# Patient Record
Sex: FEMALE | Race: WHITE | NOT HISPANIC OR LATINO | URBAN - METROPOLITAN AREA
[De-identification: names, ages, dates, MRNs, and addresses within clinical notes are randomized per-mention and may not be internally consistent; named-entity substitution may affect disease eponyms.]

---

## 2018-01-16 ENCOUNTER — EMERGENCY (EMERGENCY)
Facility: HOSPITAL | Age: 58
LOS: 0 days | Discharge: HOME | End: 2018-01-16
Attending: EMERGENCY MEDICINE | Admitting: EMERGENCY MEDICINE
Payer: COMMERCIAL

## 2018-01-16 VITALS — WEIGHT: 190.04 LBS | HEIGHT: 64 IN

## 2018-01-16 VITALS
HEART RATE: 72 BPM | DIASTOLIC BLOOD PRESSURE: 85 MMHG | RESPIRATION RATE: 19 BRPM | TEMPERATURE: 98 F | SYSTOLIC BLOOD PRESSURE: 153 MMHG | OXYGEN SATURATION: 99 %

## 2018-01-16 DIAGNOSIS — R51 HEADACHE: ICD-10-CM
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
## 2018-01-16 DIAGNOSIS — S09.90XA UNSPECIFIED INJURY OF HEAD, INITIAL ENCOUNTER: ICD-10-CM
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
## 2018-01-16 DIAGNOSIS — R60.0 LOCALIZED EDEMA: ICD-10-CM

## 2018-01-16 DIAGNOSIS — S60.222A CONTUSION OF LEFT HAND, INITIAL ENCOUNTER: ICD-10-CM

## 2018-01-16 DIAGNOSIS — M79.642 PAIN IN LEFT HAND: ICD-10-CM
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
## 2018-01-16 PROCEDURE — 70450 CT HEAD/BRAIN W/O DYE: CPT | Mod: 26

## 2018-01-16 PROCEDURE — 73130 X-RAY EXAM OF HAND: CPT | Mod: 26,LT

## 2018-01-16 PROCEDURE — 99285 EMERGENCY DEPT VISIT HI MDM: CPT

## 2018-01-16 RX ORDER — IBUPROFEN 200 MG
600 TABLET ORAL ONCE
Qty: 0 | Refills: 0 | Status: COMPLETED | OUTPATIENT
Start: 2018-01-16 | End: 2018-01-16

## 2018-01-16 RX ORDER — DIAZEPAM 5 MG
5 TABLET ORAL ONCE
Qty: 0 | Refills: 0 | Status: DISCONTINUED | OUTPATIENT
Start: 2018-01-16 | End: 2018-01-16

## 2018-01-16 RX ADMIN — Medication 600 MILLIGRAM(S): at 12:19

## 2018-01-16 RX ADMIN — Medication 5 MILLIGRAM(S): at 12:19

## 2018-01-16 NOTE — ED STATDOCS - MUSCULOSKELETAL, MLM
left hand ecchymosis and swelling on dorsal aspect from first metacarpal through second metacarpal, mild bony tenderness, FROM, NVI. Right lower lumbar paraspinal spasm and tenderness, no midline tenderness to c/t/l-spine.
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
## 2018-01-16 NOTE — ED STATDOCS - ATTENDING CONTRIBUTION TO CARE
I, Duncan Pickering DO,  performed the initial face to face bedside interview with this patient regarding history of present illness, review of symptoms and relevant past medical, social and family history.  I completed an independent physical examination.  I was the initial provider who evaluated this patient. I have signed out the follow up of any pending tests (i.e. labs, radiological studies) to the ACP.  I have communicated the patient’s plan of care and disposition with the ACP.

## 2018-01-16 NOTE — ED STATDOCS - PROGRESS NOTE DETAILS
Patient seen and evaluated.  No acute findings on imaging.  Reviewed ss of concussion with patient and will give info for concussion program in discharge paperwork.  Reviewed RICE for hand contusion -Christa Roper PA-C

## 2018-01-16 NOTE — ED STATDOCS - NS_ ATTENDINGSCRIBEDETAILS _ED_A_ED_FT
Duncan Pickering DO (Attending): The history, relevant review of systems, past medical and surgical history, medical decision making, and physical examination was documented by the scribe in my presence and I attest to the accuracy of the documentation.
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
## 2018-01-17 ENCOUNTER — APPOINTMENT (OUTPATIENT)
Dept: INTERNAL MEDICINE | Facility: CLINIC | Age: 58
End: 2018-01-17
Payer: COMMERCIAL

## 2018-01-17 VITALS — DIASTOLIC BLOOD PRESSURE: 76 MMHG | SYSTOLIC BLOOD PRESSURE: 126 MMHG

## 2018-01-17 VITALS
TEMPERATURE: 98.2 F | OXYGEN SATURATION: 97 % | RESPIRATION RATE: 18 BRPM | HEIGHT: 64 IN | DIASTOLIC BLOOD PRESSURE: 88 MMHG | WEIGHT: 190 LBS | HEART RATE: 76 BPM | SYSTOLIC BLOOD PRESSURE: 142 MMHG | BODY MASS INDEX: 32.44 KG/M2

## 2018-01-17 DIAGNOSIS — S39.012D STRAIN OF MUSCLE, FASCIA AND TENDON OF LOWER BACK, SUBSEQUENT ENCOUNTER: ICD-10-CM

## 2018-01-17 DIAGNOSIS — Z82.3 FAMILY HISTORY OF STROKE: ICD-10-CM

## 2018-01-17 DIAGNOSIS — Z78.9 OTHER SPECIFIED HEALTH STATUS: ICD-10-CM

## 2018-01-17 DIAGNOSIS — Z86.39 PERSONAL HISTORY OF OTHER ENDOCRINE, NUTRITIONAL AND METABOLIC DISEASE: ICD-10-CM

## 2018-01-17 DIAGNOSIS — S06.0X0D CONCUSSION W/OUT LOSS OF CONSCIOUSNESS, SUBSEQUENT ENCOUNTER: ICD-10-CM

## 2018-01-17 DIAGNOSIS — Z63.5 DISRUPTION OF FAMILY BY SEPARATION AND DIVORCE: ICD-10-CM

## 2018-01-17 DIAGNOSIS — Z87.39 PERSONAL HISTORY OF OTHER DISEASES OF THE MUSCULOSKELETAL SYSTEM AND CONNECTIVE TISSUE: ICD-10-CM

## 2018-01-17 DIAGNOSIS — S60.222A CONTUSION OF LEFT HAND, INITIAL ENCOUNTER: ICD-10-CM

## 2018-01-17 DIAGNOSIS — Z86.79 PERSONAL HISTORY OF OTHER DISEASES OF THE CIRCULATORY SYSTEM: ICD-10-CM

## 2018-01-17 PROCEDURE — 99214 OFFICE O/P EST MOD 30 MIN: CPT

## 2018-01-17 SDOH — SOCIAL STABILITY - SOCIAL INSECURITY: DISRUPTION OF FAMILY BY SEPARATION AND DIVORCE: Z63.5

## 2018-01-19 ENCOUNTER — APPOINTMENT (OUTPATIENT)
Dept: PHYSICAL MEDICINE AND REHAB | Facility: CLINIC | Age: 58
End: 2018-01-19
Payer: COMMERCIAL

## 2018-01-19 VITALS
WEIGHT: 190 LBS | SYSTOLIC BLOOD PRESSURE: 146 MMHG | DIASTOLIC BLOOD PRESSURE: 101 MMHG | BODY MASS INDEX: 32.44 KG/M2 | OXYGEN SATURATION: 97 % | RESPIRATION RATE: 18 BRPM | HEIGHT: 64 IN | HEART RATE: 64 BPM

## 2018-01-19 PROBLEM — S60.222A CONTUSION OF LEFT HAND, INITIAL ENCOUNTER: Status: ACTIVE | Noted: 2018-01-19

## 2018-01-19 PROCEDURE — 99204 OFFICE O/P NEW MOD 45 MIN: CPT

## 2018-07-02 ENCOUNTER — APPOINTMENT (OUTPATIENT)
Dept: INTERNAL MEDICINE | Facility: CLINIC | Age: 58
End: 2018-07-02
Payer: COMMERCIAL

## 2018-07-02 VITALS
RESPIRATION RATE: 16 BRPM | HEART RATE: 82 BPM | HEIGHT: 64 IN | OXYGEN SATURATION: 97 % | TEMPERATURE: 98.4 F | DIASTOLIC BLOOD PRESSURE: 84 MMHG | BODY MASS INDEX: 32.78 KG/M2 | SYSTOLIC BLOOD PRESSURE: 138 MMHG | WEIGHT: 192 LBS

## 2018-07-02 PROCEDURE — 99214 OFFICE O/P EST MOD 30 MIN: CPT

## 2018-07-02 RX ORDER — CYCLOBENZAPRINE HYDROCHLORIDE 5 MG/1
5 TABLET, FILM COATED ORAL
Qty: 21 | Refills: 0 | Status: DISCONTINUED | COMMUNITY
Start: 2018-01-19 | End: 2018-07-02

## 2018-07-02 RX ORDER — B-COMPLEX WITH VITAMIN C
TABLET ORAL DAILY
Refills: 0 | Status: ACTIVE | COMMUNITY

## 2018-07-02 RX ORDER — MULTIVITAMIN
TABLET ORAL DAILY
Refills: 0 | Status: ACTIVE | COMMUNITY

## 2018-07-02 RX ORDER — BACILLUS COAGULANS/INULIN 1B-250 MG
CAPSULE ORAL
Refills: 0 | Status: ACTIVE | COMMUNITY

## 2018-07-23 PROBLEM — Z78.9 ALCOHOL USE: Status: ACTIVE | Noted: 2018-01-17

## 2018-07-24 DIAGNOSIS — R74.8 ABNORMAL LEVELS OF OTHER SERUM ENZYMES: ICD-10-CM

## 2018-08-02 ENCOUNTER — OUTPATIENT (OUTPATIENT)
Dept: OUTPATIENT SERVICES | Facility: HOSPITAL | Age: 58
LOS: 1 days | End: 2018-08-02
Payer: COMMERCIAL

## 2018-08-02 ENCOUNTER — APPOINTMENT (OUTPATIENT)
Dept: ULTRASOUND IMAGING | Facility: CLINIC | Age: 58
End: 2018-08-02
Payer: COMMERCIAL

## 2018-08-02 DIAGNOSIS — Z00.8 ENCOUNTER FOR OTHER GENERAL EXAMINATION: ICD-10-CM

## 2018-08-02 PROCEDURE — 76856 US EXAM PELVIC COMPLETE: CPT

## 2018-08-02 PROCEDURE — 76700 US EXAM ABDOM COMPLETE: CPT | Mod: 26

## 2018-08-02 PROCEDURE — 76856 US EXAM PELVIC COMPLETE: CPT | Mod: 26

## 2018-08-02 PROCEDURE — 76700 US EXAM ABDOM COMPLETE: CPT

## 2018-08-04 ENCOUNTER — TRANSCRIPTION ENCOUNTER (OUTPATIENT)
Age: 58
End: 2018-08-04

## 2018-12-31 ENCOUNTER — APPOINTMENT (OUTPATIENT)
Dept: RHEUMATOLOGY | Facility: CLINIC | Age: 58
End: 2018-12-31
Payer: COMMERCIAL

## 2018-12-31 VITALS
DIASTOLIC BLOOD PRESSURE: 90 MMHG | SYSTOLIC BLOOD PRESSURE: 130 MMHG | HEART RATE: 70 BPM | OXYGEN SATURATION: 98 % | HEIGHT: 64 IN | BODY MASS INDEX: 31.58 KG/M2 | WEIGHT: 185 LBS

## 2018-12-31 PROCEDURE — 99204 OFFICE O/P NEW MOD 45 MIN: CPT

## 2019-01-05 ENCOUNTER — LABORATORY RESULT (OUTPATIENT)
Age: 59
End: 2019-01-05

## 2019-01-06 LAB
25(OH)D3 SERPL-MCNC: 16.2 NG/ML
ALBUMIN SERPL ELPH-MCNC: 4.1 G/DL
ALP BLD-CCNC: 64 U/L
ALT SERPL-CCNC: 52 U/L
ANION GAP SERPL CALC-SCNC: 12 MMOL/L
AST SERPL-CCNC: 28 U/L
BASOPHILS # BLD AUTO: 0.05 K/UL
BASOPHILS NFR BLD AUTO: 0.8 %
BILIRUB SERPL-MCNC: 0.4 MG/DL
BUN SERPL-MCNC: 14 MG/DL
CALCIUM SERPL-MCNC: 9 MG/DL
CHLORIDE SERPL-SCNC: 104 MMOL/L
CO2 SERPL-SCNC: 23 MMOL/L
CREAT SERPL-MCNC: 0.56 MG/DL
CREAT SPEC-SCNC: 23 MG/DL
CREAT/PROT UR: NORMAL
CRP SERPL-MCNC: 0.15 MG/DL
EOSINOPHIL # BLD AUTO: 0.35 K/UL
EOSINOPHIL NFR BLD AUTO: 5.7 %
ERYTHROCYTE [SEDIMENTATION RATE] IN BLOOD BY WESTERGREN METHOD: 7 MM/HR
GLUCOSE SERPL-MCNC: 97 MG/DL
HCT VFR BLD CALC: 42.7 %
HGB BLD-MCNC: 14 G/DL
IMM GRANULOCYTES NFR BLD AUTO: 0.2 %
LYMPHOCYTES # BLD AUTO: 2.34 K/UL
LYMPHOCYTES NFR BLD AUTO: 38 %
MAN DIFF?: NORMAL
MCHC RBC-ENTMCNC: 29.9 PG
MCHC RBC-ENTMCNC: 32.8 GM/DL
MCV RBC AUTO: 91 FL
MONOCYTES # BLD AUTO: 0.62 K/UL
MONOCYTES NFR BLD AUTO: 10.1 %
NEUTROPHILS # BLD AUTO: 2.79 K/UL
NEUTROPHILS NFR BLD AUTO: 45.2 %
PLATELET # BLD AUTO: 242 K/UL
POTASSIUM SERPL-SCNC: 4.3 MMOL/L
PROT SERPL-MCNC: 6.4 G/DL
PROT UR-MCNC: <4 MG/DL
RBC # BLD: 4.69 M/UL
RBC # FLD: 12.6 %
RHEUMATOID FACT SER QL: <10 IU/ML
SODIUM SERPL-SCNC: 139 MMOL/L
TSH SERPL-ACNC: 2.65 UIU/ML
WBC # FLD AUTO: 6.16 K/UL

## 2019-01-07 ENCOUNTER — APPOINTMENT (OUTPATIENT)
Dept: INTERNAL MEDICINE | Facility: CLINIC | Age: 59
End: 2019-01-07

## 2019-01-08 LAB
CARDIOLIPIN IGM SER-MCNC: 8.2 MPL
CARDIOLIPIN IGM SER-MCNC: <5 GPL
CCP AB SER IA-ACNC: <8 UNITS
DSDNA AB SER-ACNC: 15 IU/ML
ENA RNP AB SER IA-ACNC: <0.2 AL
ENA SM AB SER IA-ACNC: <0.2 AL
ENA SS-A AB SER IA-ACNC: <0.2 AL
ENA SS-B AB SER IA-ACNC: 1.9 AL
RF+CCP IGG SER-IMP: NEGATIVE

## 2019-01-09 LAB
ANA SER IF-ACNC: NEGATIVE
G6PD SER-CCNC: 14.6 U/G HGB

## 2019-01-31 ENCOUNTER — APPOINTMENT (OUTPATIENT)
Dept: INTERNAL MEDICINE | Facility: CLINIC | Age: 59
End: 2019-01-31
Payer: COMMERCIAL

## 2019-01-31 ENCOUNTER — NON-APPOINTMENT (OUTPATIENT)
Age: 59
End: 2019-01-31

## 2019-01-31 VITALS
DIASTOLIC BLOOD PRESSURE: 84 MMHG | SYSTOLIC BLOOD PRESSURE: 122 MMHG | OXYGEN SATURATION: 97 % | RESPIRATION RATE: 18 BRPM | HEART RATE: 69 BPM | WEIGHT: 189 LBS | BODY MASS INDEX: 32.27 KG/M2 | HEIGHT: 64 IN

## 2019-01-31 DIAGNOSIS — J45.20 MILD INTERMITTENT ASTHMA, UNCOMPLICATED: ICD-10-CM

## 2019-01-31 PROCEDURE — G0444 DEPRESSION SCREEN ANNUAL: CPT

## 2019-01-31 PROCEDURE — 94060 EVALUATION OF WHEEZING: CPT

## 2019-01-31 PROCEDURE — G0447 BEHAVIOR COUNSEL OBESITY 15M: CPT

## 2019-01-31 PROCEDURE — G0442 ANNUAL ALCOHOL SCREEN 15 MIN: CPT

## 2019-01-31 PROCEDURE — 99214 OFFICE O/P EST MOD 30 MIN: CPT | Mod: 25

## 2019-01-31 RX ORDER — UBIDECARENONE/VIT E ACET 100MG-5
50 MCG CAPSULE ORAL
Qty: 100 | Refills: 0 | Status: DISCONTINUED | COMMUNITY
Start: 2018-01-17 | End: 2019-01-31

## 2019-01-31 NOTE — ASSESSMENT
[FreeTextEntry1] : #1 health care maintenance. Patient to see gynecologist soon for annual mammography and PAP.\par \par #2 obesity. BMI 32.44. Patient was counseled onobesity and instructed on a weight reduction diet. Time spent on counseling and instructions was 16 minutes.\par \par #3 Vit deficiency.\par \par #4 Anxiety/depression.\par \par RV 6 mos or [prn.

## 2019-01-31 NOTE — COUNSELING
[Weight management counseling provided] : Weight management [Healthy eating counseling provided] : healthy eating [Activity counseling provided] : activity [Low Fat Diet] : Low fat diet [Decrease Portions] : Decrease food portions [___ min/wk activity recommended] : [unfilled] min/wk activity recommended [ - Annual Alcohol Misuse Screening] : Annual Alcohol Misuse Screening [ - Behavioral Counseling for Obesity (Face-to-Face for 15 Minutes)] : Behavioral Counseling for Obesity (Face-to-Face for 15 Minutes) [ - Annual Depression Screening] : Annual Depression Screening

## 2019-01-31 NOTE — HISTORY OF PRESENT ILLNESS
[FreeTextEntry1] : 6 mo visit [de-identified] : This is a pleasant 58-year-old female with a history of depression, anxiety low vitamin D level returned her following appointment per she is generally been doing well and offers no complaints. She does not smoke cigarettes. She has an occasional drink. She had a mammography in 2017 and is to see her gynecologist again seen. She had colonoscop;y in 2016.  she is refusing flu vac.\par \par She is maintained on oral vitamin D 49277 units weekly for vitamin D deficiency.

## 2019-01-31 NOTE — HEALTH RISK ASSESSMENT
[] : Yes [1] : 1) Little interest or pleasure doing things for several days (1) [0] : 2) Feeling down, depressed, or hopeless: Not at all (0) [[id number]] : 1 [Patient reported mammogram was normal] : Patient reported mammogram was normal [Patient reported colonoscopy was normal] : Patient reported colonoscopy was normal [MammogramDate] : 01/2017 [ColonoscopyDate] : 01/2016

## 2019-03-11 ENCOUNTER — TRANSCRIPTION ENCOUNTER (OUTPATIENT)
Age: 59
End: 2019-03-11

## 2019-03-27 ENCOUNTER — APPOINTMENT (OUTPATIENT)
Dept: DERMATOLOGY | Facility: CLINIC | Age: 59
End: 2019-03-27
Payer: COMMERCIAL

## 2019-03-27 VITALS — BODY MASS INDEX: 32.27 KG/M2 | HEIGHT: 64 IN | WEIGHT: 189 LBS

## 2019-03-27 PROCEDURE — 11102 TANGNTL BX SKIN SINGLE LES: CPT

## 2019-03-27 PROCEDURE — 99203 OFFICE O/P NEW LOW 30 MIN: CPT | Mod: 25

## 2019-03-27 NOTE — PHYSICAL EXAM
[Alert] : alert [Oriented x 3] : ~L oriented x 3 [Well Nourished] : well nourished [Full Body Skin Exam Performed] : performed [Eyelids] : Eyelids [Ears] : Ears [Lips] : Lips [Neck] : Neck [FreeTextEntry3] : A full skin exam was performed including the scalp, face, neck, chest, abdomen, back, buttocks, upper extremities and lower extremities.  The patient declined examination of the breasts and genitalia.  \par The exam did show the following benign growths:\par [place] pigmented nevi.\par Lentigines.\par \par hyperpigmented macule with surrounding erythematous patch.  ELM gen.

## 2019-03-27 NOTE — HISTORY OF PRESENT ILLNESS
[FreeTextEntry1] : Patient presents for skin examination. [de-identified] : Notes new lesion of the right medial calf over the past 6 months.  no bleeding.  ? growth.  No self tx.

## 2019-03-27 NOTE — ASSESSMENT
[FreeTextEntry1] : A complete skin examination was performed.  We discussed the importance of photoprotection, including the use of hats, protective clothing and sunscreens with an SPF of at least 30.  Sun avoidance was also discussed.\par \par R/O atypical nevus, melanoma vs. irritated nevus.\par Will call patient with results.

## 2019-03-29 ENCOUNTER — APPOINTMENT (OUTPATIENT)
Dept: RHEUMATOLOGY | Facility: CLINIC | Age: 59
End: 2019-03-29
Payer: COMMERCIAL

## 2019-03-29 VITALS
BODY MASS INDEX: 30.73 KG/M2 | HEIGHT: 64 IN | HEART RATE: 60 BPM | SYSTOLIC BLOOD PRESSURE: 126 MMHG | WEIGHT: 180 LBS | OXYGEN SATURATION: 98 % | DIASTOLIC BLOOD PRESSURE: 78 MMHG

## 2019-03-29 PROCEDURE — 99214 OFFICE O/P EST MOD 30 MIN: CPT

## 2019-03-29 NOTE — PHYSICAL EXAM
[General Appearance - Alert] : alert [General Appearance - Well Nourished] : well nourished [General Appearance - Well Developed] : well developed [Sclera] : the sclera and conjunctiva were normal [Outer Ear] : the ears and nose were normal in appearance [Oropharynx] : the oropharynx was normal [Neck Appearance] : the appearance of the neck was normal [Respiration, Rhythm And Depth] : normal respiratory rhythm and effort [Auscultation Breath Sounds / Voice Sounds] : lungs were clear to auscultation bilaterally [Heart Sounds] : normal S1 and S2 [Full Pulse] : the pedal pulses are present [Edema] : there was no peripheral edema [Abdomen Tenderness] : non-tender [Supraclavicular Lymph Nodes Enlarged Bilaterally] : supraclavicular [No Spinal Tenderness] : no spinal tenderness [Abnormal Walk] : normal gait [Nail Clubbing] : no clubbing  or cyanosis of the fingernails [Musculoskeletal - Swelling] : no joint swelling seen [Motor Tone] : muscle strength and tone were normal [FreeTextEntry1] : FROM neck, shoulders, elbows, wrists, hands, hips, knees, ankles and feet, including the small joints of the hands and feet without any evidence of inflammatory arthritis no tender points noted [] : no rash [Deep Tendon Reflexes (DTR)] : deep tendon reflexes were 2+ and symmetric [Motor Exam] : the motor exam was normal [Oriented To Time, Place, And Person] : oriented to person, place, and time [Impaired Insight] : insight and judgment were intact [Affect] : the affect was normal
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
## 2019-04-08 LAB — CORE LAB BIOPSY: NORMAL

## 2019-04-29 ENCOUNTER — APPOINTMENT (OUTPATIENT)
Dept: DERMATOLOGY | Facility: CLINIC | Age: 59
End: 2019-04-29
Payer: COMMERCIAL

## 2019-04-29 PROCEDURE — 13102 CMPLX RPR TRUNK ADDL 5CM/<: CPT

## 2019-04-29 PROCEDURE — 13101 CMPLX RPR TRUNK 2.6-7.5 CM: CPT

## 2019-04-29 PROCEDURE — 11604 EXC TR-EXT MAL+MARG 3.1-4 CM: CPT

## 2019-05-09 LAB — CORE LAB BIOPSY: NORMAL

## 2019-05-14 ENCOUNTER — APPOINTMENT (OUTPATIENT)
Dept: DERMATOLOGY | Facility: CLINIC | Age: 59
End: 2019-05-14
Payer: COMMERCIAL

## 2019-05-14 PROCEDURE — 99024 POSTOP FOLLOW-UP VISIT: CPT

## 2019-05-14 NOTE — HISTORY OF PRESENT ILLNESS
[de-identified] : Right calf well healed, without evidence of infection.\par Sutures removed.\par Continue wound care for additional 5 days.\par Path with clear margins.\par f/u q 6 months for TBSE's. [FreeTextEntry1] : Suture removal.

## 2019-05-24 ENCOUNTER — APPOINTMENT (OUTPATIENT)
Dept: DERMATOLOGY | Facility: CLINIC | Age: 59
End: 2019-05-24
Payer: COMMERCIAL

## 2019-05-24 PROCEDURE — 12020 TX SUPFC WND DEHSN SMPL CLSR: CPT | Mod: 79

## 2019-05-24 NOTE — PHYSICAL EXAM
[FreeTextEntry3] : R medial upper calf;\par surgical wound, complete dehiscence, with clean margins/wound bed\par no signs infection\par

## 2019-05-24 NOTE — HISTORY OF PRESENT ILLNESS
[de-identified] : The patient has been fit in for urgent appointment. \par Sutures removed from MM excision; R medial upper calf;  10 days ago\par Today, pt. was gardening, squatted down which put pressure on the leg and wound split open\par

## 2019-05-24 NOTE — ASSESSMENT
[FreeTextEntry1] : Wound dehiscence;  no signs of infection;\par options discussed with pt. and son;\par \par 2% Lido, 4 x 4-0 prolene pulley sutures to partially close wound;\par dressed with mupirocin, telfa, coban\par discussed that partial closure will protect wound while heals by 2' intention;\par f/u 5/28 for wound check

## 2019-05-28 ENCOUNTER — APPOINTMENT (OUTPATIENT)
Dept: DERMATOLOGY | Facility: CLINIC | Age: 59
End: 2019-05-28
Payer: COMMERCIAL

## 2019-05-28 PROCEDURE — 99024 POSTOP FOLLOW-UP VISIT: CPT

## 2019-05-28 NOTE — ASSESSMENT
[FreeTextEntry1] : Dehiscence\par education.\par Wound care discussed.\par sutures to remain for a full 2 weeks, then will remove, and limit patients activity for an additional 2-4 weeks.

## 2019-05-28 NOTE — HISTORY OF PRESENT ILLNESS
[FreeTextEntry1] : Dehiscence of the right calf surgical site. [de-identified] : She was seen 4 days ago, at which time Dr. Coles give 4 new sutures, nearly approximating wound edges, and tx'ing with bactroban ointment.  Currently with mild irritation, and mild aroma, but has had dressing in place since placed at time of new sutures.\par Dehiscence occurred while weeding garden, almost 2 weeks s/p suture removal, almost 4 weeks s/p surgical excision.

## 2019-06-06 ENCOUNTER — APPOINTMENT (OUTPATIENT)
Dept: DERMATOLOGY | Facility: CLINIC | Age: 59
End: 2019-06-06
Payer: COMMERCIAL

## 2019-06-06 VITALS — WEIGHT: 180 LBS | BODY MASS INDEX: 30.73 KG/M2 | HEIGHT: 64 IN

## 2019-06-06 DIAGNOSIS — T81.31XA DISRUPTION OF EXTERNAL OPERATION (SURGICAL) WOUND, NOT ELSEWHERE CLASSIFIED, INITIAL ENCOUNTER: ICD-10-CM

## 2019-06-06 PROCEDURE — 99212 OFFICE O/P EST SF 10 MIN: CPT

## 2019-06-06 NOTE — ASSESSMENT
[FreeTextEntry1] : Patient s/p excision.\par Wound healing nicely with granulation tissue at the base.\par Leg wrapped.\par Further wound care discussed.

## 2019-06-06 NOTE — PHYSICAL EXAM
[Alert] : alert [Well Nourished] : well nourished [Oriented x 3] : ~L oriented x 3 [FreeTextEntry3] : Right calf with slowly healing linear incision.\par Sutures removed.\par

## 2019-06-06 NOTE — HISTORY OF PRESENT ILLNESS
[FreeTextEntry1] : Dehiscence - healing well, on the right calf. [de-identified] : Still with some drainage.

## 2019-06-19 ENCOUNTER — RX RENEWAL (OUTPATIENT)
Age: 59
End: 2019-06-19

## 2019-06-27 ENCOUNTER — APPOINTMENT (OUTPATIENT)
Dept: DERMATOLOGY | Facility: CLINIC | Age: 59
End: 2019-06-27
Payer: COMMERCIAL

## 2019-06-27 VITALS — WEIGHT: 180 LBS | BODY MASS INDEX: 30.73 KG/M2 | HEIGHT: 64 IN

## 2019-06-27 PROCEDURE — 99213 OFFICE O/P EST LOW 20 MIN: CPT

## 2019-06-27 NOTE — ASSESSMENT
[FreeTextEntry1] : Patient may return to full activity.\par Discussed sunscreens, sun protection.\par f/u twice annually for TBSE's.

## 2019-06-27 NOTE — HISTORY OF PRESENT ILLNESS
[FreeTextEntry1] : Wound check on the right leg. [de-identified] : Patient s/p melanoma excision, with dehiscence, re-repair, and for re-evaluation.

## 2019-07-08 ENCOUNTER — APPOINTMENT (OUTPATIENT)
Dept: INTERNAL MEDICINE | Facility: CLINIC | Age: 59
End: 2019-07-08

## 2019-07-10 ENCOUNTER — APPOINTMENT (OUTPATIENT)
Dept: INTERNAL MEDICINE | Facility: CLINIC | Age: 59
End: 2019-07-10
Payer: COMMERCIAL

## 2019-07-10 VITALS
OXYGEN SATURATION: 98 % | DIASTOLIC BLOOD PRESSURE: 66 MMHG | WEIGHT: 180 LBS | HEIGHT: 64 IN | RESPIRATION RATE: 16 BRPM | SYSTOLIC BLOOD PRESSURE: 114 MMHG | TEMPERATURE: 98 F | HEART RATE: 60 BPM | BODY MASS INDEX: 30.73 KG/M2

## 2019-07-10 PROCEDURE — 99396 PREV VISIT EST AGE 40-64: CPT

## 2019-07-10 RX ORDER — MUPIROCIN 20 MG/G
2 OINTMENT TOPICAL
Qty: 1 | Refills: 2 | Status: DISCONTINUED | COMMUNITY
Start: 2019-05-24 | End: 2019-07-10

## 2019-07-10 RX ORDER — SOFT LENS ADJUNCTIVE SOLUTIONS
DROPS MISCELLANEOUS
Refills: 0 | Status: ACTIVE | COMMUNITY

## 2019-07-10 NOTE — HISTORY OF PRESENT ILLNESS
[FreeTextEntry1] : Patient comes in for an annual physical exam.\par  [de-identified] : Patient is a 59-year-old female with history of Sjogren's syndrome, melanoma, anxiety and depression comes in for annual exam. She was recently diagnosed with melanoma of the right lower calf and March 2019. She had excision done by dermatology and her margins were clear and she is healing well. She'll continue to followup with dermatology regularly.\par \par She is also recently diagnosed with Sjogren's syndrome and is following with rheumatology regularly. She is opted to not start on medication and is instead treating her symptoms with vitamins and eyedrops.\par \par She follows regularly with psychologists Greer Goldsmith and sees her every 2 weeks. She takes Xanax apparently one tablet 2-3 times a month.\par She recently saw GYN and had a normal mammogram and a bone density which showed osteopenia.\par She notes that her colonoscopy was done in 2014 and she is now due for repeat.\par She is trying to cut back and lose weight as her daughter's wedding is coming up in October.

## 2019-07-10 NOTE — HEALTH RISK ASSESSMENT
[Good] : ~his/her~  mood as  good [Yes] : Yes [No] : In the past 12 months have you used drugs other than those required for medical reasons? No [No Retinopathy] : No retinopathy [Patient reported PAP Smear was normal] : Patient reported PAP Smear was normal [Patient reported mammogram was normal] : Patient reported mammogram was normal [Patient reported bone density results were normal] : Patient reported bone density results were normal [Smoke Detector] : smoke detector [Patient reported colonoscopy was normal] : Patient reported colonoscopy was normal [Employed] : employed [Seat Belt] :  uses seat belt [Carbon Monoxide Detector] : carbon monoxide detector [Sunscreen] : uses sunscreen [FreeTextEntry1] : physical  [de-identified] : former smoker  [] : No [YearQuit] : 1990 [de-identified] : occasional  [EyeExamDate] : 01/2019 [Guns at Home] : no guns at home [MammogramDate] : 01/2019 [PapSmearDate] : 01/2019 [BoneDensityDate] : 01/2019 [BoneDensityComments] : osteopenia  [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

## 2019-07-10 NOTE — PAST MEDICAL HISTORY
[Menarche Age ____] : age at menarche was [unfilled] [Total Preg ___] : G[unfilled] [Approximately ___] : the LMP was approximately [unfilled]

## 2019-07-10 NOTE — COUNSELING
[Healthy eating counseling provided] : healthy eating [Behavioral health counseling provided] : behavioral health  [Low Fat Diet] : Low fat diet [Activity counseling provided] : activity

## 2019-07-10 NOTE — ASSESSMENT
[FreeTextEntry1] : 1.health maintenance: She is up-to-date with mammogram, bone density and Pap smear. She will follow up with gastroenterology and repeat colonoscopy. Discussed fasting blood work.\par \par 2. Melanoma status post excision: Continue with skin surround and follow up with dermatology.\par \par 3.Sjogren syndrome: Continue on eyedrops and vitamin D, follow up with rheumatology.\par \par 4.anxiety and depression: She is estranged from her  who suffers from mental health and alcoholism. T4 with psychology and Xanax p.r.n.

## 2019-07-23 LAB
25(OH)D3 SERPL-MCNC: 42.4 NG/ML
CHOLEST SERPL-MCNC: 203 MG/DL
CHOLEST/HDLC SERPL: 4.3 RATIO
HDLC SERPL-MCNC: 47 MG/DL
LDLC SERPL CALC-MCNC: 135 MG/DL
TRIGL SERPL-MCNC: 103 MG/DL

## 2019-08-30 ENCOUNTER — RX RENEWAL (OUTPATIENT)
Age: 59
End: 2019-08-30

## 2019-09-14 ENCOUNTER — LABORATORY RESULT (OUTPATIENT)
Age: 59
End: 2019-09-14

## 2019-09-17 LAB
ALBUMIN MFR SERPL ELPH: 64.8 %
ALBUMIN SERPL ELPH-MCNC: 4.6 G/DL
ALBUMIN SERPL-MCNC: 4.3 G/DL
ALBUMIN/GLOB SERPL: 1.9 RATIO
ALP BLD-CCNC: 55 U/L
ALPHA1 GLOB MFR SERPL ELPH: 3.7 %
ALPHA1 GLOB SERPL ELPH-MCNC: 0.2 G/DL
ALPHA2 GLOB MFR SERPL ELPH: 8.5 %
ALPHA2 GLOB SERPL ELPH-MCNC: 0.6 G/DL
ALT SERPL-CCNC: 32 U/L
ANION GAP SERPL CALC-SCNC: 15 MMOL/L
APPEARANCE: CLEAR
AST SERPL-CCNC: 29 U/L
B-GLOBULIN MFR SERPL ELPH: 12.2 %
B-GLOBULIN SERPL ELPH-MCNC: 0.8 G/DL
BASOPHILS # BLD AUTO: 0.05 K/UL
BASOPHILS NFR BLD AUTO: 0.9 %
BILIRUB SERPL-MCNC: 0.4 MG/DL
BILIRUBIN URINE: NEGATIVE
BLOOD URINE: NEGATIVE
BUN SERPL-MCNC: 12 MG/DL
C3 SERPL-MCNC: 121 MG/DL
C4 SERPL-MCNC: 20 MG/DL
CALCIUM SERPL-MCNC: 9.5 MG/DL
CCP AB SER IA-ACNC: <8 UNITS
CHLORIDE SERPL-SCNC: 101 MMOL/L
CO2 SERPL-SCNC: 24 MMOL/L
COLOR: NORMAL
CREAT SERPL-MCNC: 0.87 MG/DL
CREAT SPEC-SCNC: 59 MG/DL
CREAT/PROT UR: 0.1 RATIO
CRP SERPL-MCNC: 0.15 MG/DL
DSDNA AB SER-ACNC: 22 IU/ML
ENA RNP AB SER IA-ACNC: <0.2 AL
ENA SM AB SER IA-ACNC: <0.2 AL
ENA SS-A AB SER IA-ACNC: <0.2 AL
ENA SS-B AB SER IA-ACNC: 1.4 AL
EOSINOPHIL # BLD AUTO: 0.22 K/UL
EOSINOPHIL NFR BLD AUTO: 3.8 %
ERYTHROCYTE [SEDIMENTATION RATE] IN BLOOD BY WESTERGREN METHOD: 7 MM/HR
GAMMA GLOB FLD ELPH-MCNC: 0.7 G/DL
GAMMA GLOB MFR SERPL ELPH: 10.8 %
GLUCOSE QUALITATIVE U: NEGATIVE
GLUCOSE SERPL-MCNC: 87 MG/DL
HCT VFR BLD CALC: 43.3 %
HGB BLD-MCNC: 14.5 G/DL
IMM GRANULOCYTES NFR BLD AUTO: 0.2 %
INTERPRETATION SERPL IEP-IMP: NORMAL
KETONES URINE: ABNORMAL
LEUKOCYTE ESTERASE URINE: NEGATIVE
LYMPHOCYTES # BLD AUTO: 2.06 K/UL
LYMPHOCYTES NFR BLD AUTO: 35.5 %
MAN DIFF?: NORMAL
MCHC RBC-ENTMCNC: 30.7 PG
MCHC RBC-ENTMCNC: 33.5 GM/DL
MCV RBC AUTO: 91.5 FL
MONOCYTES # BLD AUTO: 0.56 K/UL
MONOCYTES NFR BLD AUTO: 9.7 %
NEUTROPHILS # BLD AUTO: 2.9 K/UL
NEUTROPHILS NFR BLD AUTO: 49.9 %
NITRITE URINE: NEGATIVE
PH URINE: 7
PLATELET # BLD AUTO: 249 K/UL
POTASSIUM SERPL-SCNC: 4.4 MMOL/L
PROT SERPL-MCNC: 6.6 G/DL
PROT UR-MCNC: 4 MG/DL
PROTEIN URINE: NEGATIVE
RBC # BLD: 4.73 M/UL
RBC # FLD: 11.4 %
RF+CCP IGG SER-IMP: NEGATIVE
RHEUMATOID FACT SER QL: <10 IU/ML
SODIUM SERPL-SCNC: 140 MMOL/L
SPECIFIC GRAVITY URINE: 1.01
UROBILINOGEN URINE: NORMAL
WBC # FLD AUTO: 5.8 K/UL

## 2019-09-18 LAB — ANA SER IF-ACNC: NEGATIVE

## 2019-09-27 ENCOUNTER — APPOINTMENT (OUTPATIENT)
Dept: RHEUMATOLOGY | Facility: CLINIC | Age: 59
End: 2019-09-27
Payer: COMMERCIAL

## 2019-09-27 VITALS
OXYGEN SATURATION: 98 % | WEIGHT: 171 LBS | DIASTOLIC BLOOD PRESSURE: 83 MMHG | HEART RATE: 60 BPM | BODY MASS INDEX: 29.19 KG/M2 | HEIGHT: 64 IN | SYSTOLIC BLOOD PRESSURE: 128 MMHG

## 2019-09-27 PROCEDURE — 99214 OFFICE O/P EST MOD 30 MIN: CPT
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
## 2019-09-27 NOTE — PHYSICAL EXAM
[General Appearance - Alert] : alert [General Appearance - Well Nourished] : well nourished [General Appearance - Well Developed] : well developed [Sclera] : the sclera and conjunctiva were normal [Outer Ear] : the ears and nose were normal in appearance [Oropharynx] : the oropharynx was normal [Neck Appearance] : the appearance of the neck was normal [Respiration, Rhythm And Depth] : normal respiratory rhythm and effort [Heart Sounds] : normal S1 and S2 [Auscultation Breath Sounds / Voice Sounds] : lungs were clear to auscultation bilaterally [Abdomen Tenderness] : non-tender [Full Pulse] : the pedal pulses are present [Edema] : there was no peripheral edema [No Spinal Tenderness] : no spinal tenderness [Supraclavicular Lymph Nodes Enlarged Bilaterally] : supraclavicular [Abnormal Walk] : normal gait [Nail Clubbing] : no clubbing  or cyanosis of the fingernails [Motor Tone] : muscle strength and tone were normal [Musculoskeletal - Swelling] : no joint swelling seen [] : no rash [FreeTextEntry1] : + dry skin elbows [Deep Tendon Reflexes (DTR)] : deep tendon reflexes were 2+ and symmetric [Motor Exam] : the motor exam was normal [Impaired Insight] : insight and judgment were intact [Oriented To Time, Place, And Person] : oriented to person, place, and time [Affect] : the affect was normal

## 2019-10-02 ENCOUNTER — APPOINTMENT (OUTPATIENT)
Dept: DERMATOLOGY | Facility: CLINIC | Age: 59
End: 2019-10-02
Payer: COMMERCIAL

## 2019-10-02 PROCEDURE — 99213 OFFICE O/P EST LOW 20 MIN: CPT

## 2019-10-02 NOTE — ASSESSMENT
[FreeTextEntry1] : A complete skin examination was performed.  There is no evidence of skin cancer.  We discussed the importance of photoprotection, including the use of hats, protective clothing and sunscreens with an SPF of at least 30.  Sun avoidance was also discussed.\par \par hidrocystoma - right lateral canthal region.\par benign.

## 2019-10-02 NOTE — HISTORY OF PRESENT ILLNESS
[FreeTextEntry1] : Patient presents for skin examination. [de-identified] : Denies new, changing, bleeding or tender lesions on the skin over the past 6 months.\par

## 2019-10-02 NOTE — PHYSICAL EXAM
[Alert] : alert [Oriented x 3] : ~L oriented x 3 [Well Nourished] : well nourished [Full Body Skin Exam Performed] : performed [Nodes] : Nodes [FreeTextEntry3] : A full skin exam was performed including the scalp, face, neck, chest, abdomen, back, buttocks, upper extremities and lower extremities.  The patient declined examination of the breasts and genitalia.  \par The exam did not reveal any evidence of skin cancer, showing only the following benign growths:\par [place] pigmented nevi.\par Seborrheic keratoses.\par Lentigines.\par \par Clear cystic papule, with lateral canthal region.  3-4 mm.

## 2019-12-13 ENCOUNTER — RX RENEWAL (OUTPATIENT)
Age: 59
End: 2019-12-13

## 2020-01-10 ENCOUNTER — APPOINTMENT (OUTPATIENT)
Dept: INTERNAL MEDICINE | Facility: CLINIC | Age: 60
End: 2020-01-10
Payer: COMMERCIAL

## 2020-01-10 ENCOUNTER — NON-APPOINTMENT (OUTPATIENT)
Age: 60
End: 2020-01-10

## 2020-01-10 VITALS
BODY MASS INDEX: 31.07 KG/M2 | HEART RATE: 76 BPM | HEIGHT: 64 IN | SYSTOLIC BLOOD PRESSURE: 130 MMHG | WEIGHT: 182 LBS | RESPIRATION RATE: 16 BRPM | OXYGEN SATURATION: 98 % | TEMPERATURE: 98.1 F | DIASTOLIC BLOOD PRESSURE: 86 MMHG

## 2020-01-10 PROCEDURE — 99214 OFFICE O/P EST MOD 30 MIN: CPT | Mod: 25

## 2020-01-10 PROCEDURE — 93000 ELECTROCARDIOGRAM COMPLETE: CPT

## 2020-01-10 NOTE — ASSESSMENT
[FreeTextEntry1] : 1.hyperlipidemia: EKG in the office done and is stable from previous, obtain carotid ultrasound, recheck fasting lipids. Obtain records from cardiology.\par \par 2.vitamin D deficiency: Recheck level, continue on supplementation.\par \par 3.anxiety and depression: She is very happy to finally be , continue on Xanax as needed, follow up with psychology.

## 2020-02-20 ENCOUNTER — TRANSCRIPTION ENCOUNTER (OUTPATIENT)
Age: 60
End: 2020-02-20

## 2020-02-24 ENCOUNTER — TRANSCRIPTION ENCOUNTER (OUTPATIENT)
Age: 60
End: 2020-02-24

## 2020-03-10 LAB
ALBUMIN SERPL ELPH-MCNC: 4.3 G/DL
ALP BLD-CCNC: 64 U/L
ALT SERPL-CCNC: 28 U/L
ANION GAP SERPL CALC-SCNC: 13 MMOL/L
AST SERPL-CCNC: 14 U/L
BASOPHILS # BLD AUTO: 0.07 K/UL
BASOPHILS NFR BLD AUTO: 0.9 %
BILIRUB SERPL-MCNC: 0.4 MG/DL
BUN SERPL-MCNC: 12 MG/DL
CALCIUM SERPL-MCNC: 9.4 MG/DL
CHLORIDE SERPL-SCNC: 101 MMOL/L
CHOLEST SERPL-MCNC: 226 MG/DL
CHOLEST/HDLC SERPL: 4.2 RATIO
CO2 SERPL-SCNC: 26 MMOL/L
CREAT SERPL-MCNC: 0.83 MG/DL
EOSINOPHIL # BLD AUTO: 0.33 K/UL
EOSINOPHIL NFR BLD AUTO: 4.3 %
GLUCOSE SERPL-MCNC: 89 MG/DL
HCT VFR BLD CALC: 45.1 %
HDLC SERPL-MCNC: 54 MG/DL
HGB BLD-MCNC: 14.4 G/DL
IMM GRANULOCYTES NFR BLD AUTO: 0.1 %
LDLC SERPL CALC-MCNC: 132 MG/DL
LYMPHOCYTES # BLD AUTO: 2.63 K/UL
LYMPHOCYTES NFR BLD AUTO: 34.5 %
MAN DIFF?: NORMAL
MCHC RBC-ENTMCNC: 30.2 PG
MCHC RBC-ENTMCNC: 31.9 GM/DL
MCV RBC AUTO: 94.5 FL
MONOCYTES # BLD AUTO: 0.8 K/UL
MONOCYTES NFR BLD AUTO: 10.5 %
NEUTROPHILS # BLD AUTO: 3.78 K/UL
NEUTROPHILS NFR BLD AUTO: 49.7 %
PLATELET # BLD AUTO: 299 K/UL
POTASSIUM SERPL-SCNC: 4.4 MMOL/L
PROT SERPL-MCNC: 6.7 G/DL
RBC # BLD: 4.77 M/UL
RBC # FLD: 12.1 %
SODIUM SERPL-SCNC: 140 MMOL/L
TRIGL SERPL-MCNC: 204 MG/DL
WBC # FLD AUTO: 7.62 K/UL

## 2020-05-27 ENCOUNTER — APPOINTMENT (OUTPATIENT)
Dept: RHEUMATOLOGY | Facility: CLINIC | Age: 60
End: 2020-05-27
Payer: COMMERCIAL

## 2020-05-27 VITALS
BODY MASS INDEX: 31.58 KG/M2 | HEIGHT: 64 IN | WEIGHT: 185 LBS | SYSTOLIC BLOOD PRESSURE: 120 MMHG | OXYGEN SATURATION: 98 % | DIASTOLIC BLOOD PRESSURE: 80 MMHG | HEART RATE: 62 BPM | TEMPERATURE: 98.5 F

## 2020-05-27 PROCEDURE — 99214 OFFICE O/P EST MOD 30 MIN: CPT

## 2020-05-28 NOTE — PHYSICAL EXAM
[General Appearance - Alert] : alert [General Appearance - Well Nourished] : well nourished [General Appearance - Well Developed] : well developed [Outer Ear] : the ears and nose were normal in appearance [Sclera] : the sclera and conjunctiva were normal [Oropharynx] : the oropharynx was normal [Neck Appearance] : the appearance of the neck was normal [Auscultation Breath Sounds / Voice Sounds] : lungs were clear to auscultation bilaterally [Respiration, Rhythm And Depth] : normal respiratory rhythm and effort [Heart Sounds] : normal S1 and S2 [Full Pulse] : the pedal pulses are present [Edema] : there was no peripheral edema [Abdomen Tenderness] : non-tender [Supraclavicular Lymph Nodes Enlarged Bilaterally] : supraclavicular [No Spinal Tenderness] : no spinal tenderness [Abnormal Walk] : normal gait [Nail Clubbing] : no clubbing  or cyanosis of the fingernails [Musculoskeletal - Swelling] : no joint swelling seen [Motor Tone] : muscle strength and tone were normal [] : no rash [FreeTextEntry1] : + dry skin elbows [Deep Tendon Reflexes (DTR)] : deep tendon reflexes were 2+ and symmetric [Motor Exam] : the motor exam was normal [Oriented To Time, Place, And Person] : oriented to person, place, and time [Impaired Insight] : insight and judgment were intact [Affect] : the affect was normal
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
## 2020-05-28 NOTE — ASSESSMENT
[FreeTextEntry1] : 59-year-old  female with no significant past medical history presents for further evaluation of a positive WILBERT. The WILBERT was drawn during a routine appointment.\par \par Her current review of systems is positive for arthralgias that have slowly improved. She also admits to severe dry eyes with constant tearing in the eyes. She has mild dry eyes with ophthalmology. \par \par Her WILBERT was positive, thyroid antibodies were positive and she had a Sjogren's antibody.\par \par Today, her exam is essentially normal without any evidence of an underlying inflammatory arthropathy.\par \par Certainly based on her recent labs the WILBERT may be positive secondary to either thyroid disease or Sjogren's syndrome.\par \par Positive WILBERT-\par -The WILBERT may be elevated secondary to thyroid or Sjogren's syndrome or even a false positive.\par \par Sjogrens syndrome-\par - to continue with topical rx\par -Currently there is no indication for any additional medication use.\par -If her arthralgias persist in the future I will consider Plaquenil use.\par \par Polyarthralgia-\par -she does not have any tender points on exam.\par -joint pains are better with exercise. \par -To use NSAIDs p.r.n.\par -Recommend gentle stretching as tolerated.\par \par Low vit D- \par arthralgias improved with vit D use, to continue weekly dose. \par \par Followup 12 months. She is aware to call if there is any change in her underlying symptoms.

## 2020-06-12 LAB
C3 SERPL-MCNC: 125 MG/DL
C4 SERPL-MCNC: 20 MG/DL
CREAT SPEC-SCNC: 65 MG/DL
CREAT/PROT UR: NORMAL RATIO
DSDNA AB SER-ACNC: 18 IU/ML
ENA SS-A AB SER IA-ACNC: <0.2 AL
ENA SS-B AB SER IA-ACNC: 2.1 AL
ERYTHROCYTE [SEDIMENTATION RATE] IN BLOOD BY WESTERGREN METHOD: 18 MM/HR
PROT UR-MCNC: <4 MG/DL
SARS-COV-2 IGG SERPL IA-ACNC: <3.8 AU/ML
SARS-COV-2 IGG SERPL QL IA: NEGATIVE

## 2020-06-13 LAB
25(OH)D3 SERPL-MCNC: 35.5 NG/ML
ANA SER IF-ACNC: NEGATIVE
CRP SERPL-MCNC: 0.29 MG/DL
RHEUMATOID FACT SER QL: 11 IU/ML

## 2020-06-17 LAB
CCP AB SER IA-ACNC: <8 UNITS
RF+CCP IGG SER-IMP: NEGATIVE

## 2020-07-25 ENCOUNTER — APPOINTMENT (OUTPATIENT)
Dept: DERMATOLOGY | Facility: CLINIC | Age: 60
End: 2020-07-25
Payer: COMMERCIAL

## 2020-07-25 VITALS — WEIGHT: 185 LBS | BODY MASS INDEX: 31.76 KG/M2

## 2020-07-25 DIAGNOSIS — Z85.820 PERSONAL HISTORY OF MALIGNANT MELANOMA OF SKIN: ICD-10-CM

## 2020-07-25 PROCEDURE — 99213 OFFICE O/P EST LOW 20 MIN: CPT

## 2020-07-25 NOTE — HISTORY OF PRESENT ILLNESS
[FreeTextEntry1] : Patient presents for skin examination. [de-identified] : Denies new, changing, bleeding or tender lesions on the skin over the past 6 months.\par

## 2020-07-25 NOTE — ASSESSMENT
[FreeTextEntry1] : A complete skin examination was performed.  There is no evidence of skin cancer.  We discussed the importance of photoprotection, including the use of hats, protective clothing and sunscreens with an SPF of at least 30.  Sun avoidance was also discussed.  The ABCDE's of melanoma was discussed.  Regular skin exams recommended.\par \par Erythematous macule, right superior breast - likely excoriated SK.\par Call if persists or expands.

## 2020-07-25 NOTE — PHYSICAL EXAM
[Alert] : alert [Oriented x 3] : ~L oriented x 3 [Full Body Skin Exam Performed] : performed [Well Nourished] : well nourished [FreeTextEntry3] : A full skin exam was performed including the scalp, face (including the lips, ears, nose and eyes), neck, chest, breasts, abdomen, back, buttocks, upper extremities and lower extremities.  The patient declined examination of the genitalia.  \par The exam revealed the following benign growths:\par [place] pigmented nevi.\par Seborrheic keratoses.\par Lentigines.\par \par Erythematous macule, with adjacent SK - right superior breast.  3 mm.

## 2020-07-31 ENCOUNTER — APPOINTMENT (OUTPATIENT)
Dept: INTERNAL MEDICINE | Facility: CLINIC | Age: 60
End: 2020-07-31
Payer: COMMERCIAL

## 2020-07-31 VITALS
TEMPERATURE: 98.1 F | DIASTOLIC BLOOD PRESSURE: 78 MMHG | OXYGEN SATURATION: 98 % | HEART RATE: 69 BPM | WEIGHT: 181 LBS | BODY MASS INDEX: 30.9 KG/M2 | SYSTOLIC BLOOD PRESSURE: 122 MMHG | RESPIRATION RATE: 16 BRPM | HEIGHT: 64 IN

## 2020-07-31 PROCEDURE — 99396 PREV VISIT EST AGE 40-64: CPT

## 2020-07-31 NOTE — ASSESSMENT
[FreeTextEntry1] : 1.health maintenance: Discussed getting fasting lipids done, reviewed recent blood work done by rheumatology. She will follow up with GYN regarding Pap smear and mammogram as well as GI for colonoscopy. Up-to-date with vaccinations.\par \par 2.hyperlipidemia: Recheck fasting lipids, continue low cholesterol diet.\par \par 3.anxiety and depression: Xanax 0.25 mg refilled today, follow up with therapist.\par \par 4.sjogrens syndrome: Continuing eyedrops and vitamin D, follow up with rheumatology.\par \par 5.melanoma status post excision: Followup with dermatology, recent visit noted.

## 2020-07-31 NOTE — HISTORY OF PRESENT ILLNESS
[FreeTextEntry1] : Patient comes in for an annual physical exam.\par  [de-identified] : This is a 52-year-old female history of stroke and syndrome, melanoma, anxiety depression comes in for annual exam. She is doing well has had some exacerbations of her anxiety due to the pandemic. She requests a refill of her Xanax that she has not filled for some time. She continues to speak with her therapist Greer Goldsmith. She has not had a chance to have her mammogram, Pap smear, bone density or colonoscopy done yet this year. She understands the need for followup.\par Patient denies any cp, sob,abdominal pain, nausea, vomiting, palpitations, fever, chills, constipation, diarrhea.\par

## 2020-07-31 NOTE — HEALTH RISK ASSESSMENT
[Yes] : Yes [0] : 2) Feeling down, depressed, or hopeless: Not at all (0) [Smoke Detector] : smoke detector [Employed] : employed [Carbon Monoxide Detector] : carbon monoxide detector [Safety elements used in home] : safety elements used in home [Seat Belt] :  uses seat belt [Sunscreen] : uses sunscreen [] : No [YearQuit] : 1991 [de-identified] : Occasional [ZYR5Amiap] : 0 [Patient reported mammogram was normal] : Patient reported mammogram was normal [Patient reported PAP Smear was normal] : Patient reported PAP Smear was normal [Patient reported bone density results were normal] : Patient reported bone density results were normal [Patient reported colonoscopy was normal] : Patient reported colonoscopy was normal [] :  [Fully functional (bathing, dressing, toileting, transferring, walking, feeding)] : Fully functional (bathing, dressing, toileting, transferring, walking, feeding) [Fully functional (using the telephone, shopping, preparing meals, housekeeping, doing laundry, using] : Fully functional and needs no help or supervision to perform IADLs (using the telephone, shopping, preparing meals, housekeeping, doing laundry, using transportation, managing medications and managing finances) [TB Exposure] : is not being exposed to tuberculosis [MammogramDate] : 2018 [PapSmearDate] : 2018 [BoneDensityDate] : 2018 [FreeTextEntry2] : Teacher

## 2020-10-28 ENCOUNTER — APPOINTMENT (OUTPATIENT)
Dept: ORTHOPEDIC SURGERY | Facility: CLINIC | Age: 60
End: 2020-10-28
Payer: COMMERCIAL

## 2020-10-28 VITALS
DIASTOLIC BLOOD PRESSURE: 86 MMHG | HEIGHT: 64 IN | SYSTOLIC BLOOD PRESSURE: 159 MMHG | WEIGHT: 190 LBS | BODY MASS INDEX: 32.44 KG/M2 | HEART RATE: 63 BPM

## 2020-10-28 DIAGNOSIS — M89.8X1 OTHER SPECIFIED DISORDERS OF BONE, SHOULDER: ICD-10-CM

## 2020-10-28 PROCEDURE — 99072 ADDL SUPL MATRL&STAF TM PHE: CPT

## 2020-10-28 PROCEDURE — 99204 OFFICE O/P NEW MOD 45 MIN: CPT

## 2020-10-28 PROCEDURE — 73030 X-RAY EXAM OF SHOULDER: CPT | Mod: LT
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
## 2020-10-28 NOTE — PHYSICAL EXAM
[Normal Finger/nose] : finger to nose coordination [Normal] : no peripheral adenopathy appreciated [de-identified] : Left Shoulder Exam\par \par Inspection: No malalignment, No defects\par Skin: No masses, No lesions\par Neck: Negative Spurlings, full ROM without pain\par ROM: Range of motion of the left shoulder is fully intact with forward flexion, abduction, internal and external rotation\par Painful arc ROM: None\par Tenderness: No bicipital tenderness, no tenderness to the greater tuberosity/RTC insertion, no anterior shoulder/lesser tuberosity tenderness, positive periscapular tenderness. Negative cervical spine tenderness\par Strength: 5/5 ER, 5/5 IR in adduction, 5/5 supraspinatus testing\par AC Joint: No ttp, no pain with cross arm testing\par Biceps: Speed mildly positive\par Impingement test: Negative Horne\par Stability: Negative apprehension, negative anterior/posterior load and shift\par Vasc: 2+ radial pulse\par Neuro: AIN, PIN, Ulnar nerve in tact to motor\par Sensation: In tact to light touch throughout\par \par  [de-identified] : BOBBYR [de-identified] : 3 x-ray views of the left shoulder were obtained, there are no osseous abnormalities
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
## 2021-01-22 ENCOUNTER — NON-APPOINTMENT (OUTPATIENT)
Age: 61
End: 2021-01-22

## 2021-01-25 ENCOUNTER — APPOINTMENT (OUTPATIENT)
Dept: DERMATOLOGY | Facility: CLINIC | Age: 61
End: 2021-01-25
Payer: COMMERCIAL

## 2021-01-25 PROCEDURE — 99072 ADDL SUPL MATRL&STAF TM PHE: CPT

## 2021-01-25 PROCEDURE — 99213 OFFICE O/P EST LOW 20 MIN: CPT

## 2021-01-25 NOTE — PHYSICAL EXAM
[Alert] : alert [Oriented x 3] : ~L oriented x 3 [Well Nourished] : well nourished [Full Body Skin Exam Performed] : performed [FreeTextEntry3] : A full skin exam was performed including the scalp, face, neck, chest, abdomen, back, buttocks, upper extremities and lower extremities.  The patient declined examination of the breasts and genitalia.  \par The exam did show the following benign growths:\par Seborrheic keratoses.\par Lentigines.\par \par Cystic papule, translucent surface, right lateral canthal region.  4 mm.

## 2021-01-25 NOTE — HISTORY OF PRESENT ILLNESS
[FreeTextEntry1] : Patient presents for skin examination. [de-identified] : Denies new, changing, bleeding or tender lesions on the skin over the past 6 months.\par

## 2021-01-25 NOTE — ASSESSMENT
[FreeTextEntry1] : A complete skin examination was performed.  There is no evidence of skin cancer.  We discussed the importance of photoprotection, including the use of hats, protective clothing and sunscreens with an SPF of at least 30.  Sun avoidance was also discussed.  The ABCDE's of melanoma was discussed.  Regular skin exams recommended.\par \par Hidrocystoma\par Benign.\par Education.  Follow.

## 2021-05-26 ENCOUNTER — APPOINTMENT (OUTPATIENT)
Dept: RHEUMATOLOGY | Facility: CLINIC | Age: 61
End: 2021-05-26

## 2021-07-07 DIAGNOSIS — R76.8 OTHER SPECIFIED ABNORMAL IMMUNOLOGICAL FINDINGS IN SERUM: ICD-10-CM

## 2021-07-09 LAB
ALBUMIN SERPL ELPH-MCNC: 4.2 G/DL
ALP BLD-CCNC: 64 U/L
ALT SERPL-CCNC: 37 U/L
ANION GAP SERPL CALC-SCNC: 14 MMOL/L
AST SERPL-CCNC: 23 U/L
BASOPHILS # BLD AUTO: 0.05 K/UL
BASOPHILS NFR BLD AUTO: 0.8 %
BILIRUB SERPL-MCNC: 0.6 MG/DL
BUN SERPL-MCNC: 10 MG/DL
C3 SERPL-MCNC: 123 MG/DL
C4 SERPL-MCNC: 20 MG/DL
CALCIUM SERPL-MCNC: 9.4 MG/DL
CHLORIDE SERPL-SCNC: 104 MMOL/L
CO2 SERPL-SCNC: 21 MMOL/L
CREAT SERPL-MCNC: 0.81 MG/DL
CRP SERPL-MCNC: <3 MG/L
ENA RNP AB SER IA-ACNC: <0.2 AL
ENA SM AB SER IA-ACNC: <0.2 AL
ENA SS-A AB SER IA-ACNC: <0.2 AL
ENA SS-B AB SER IA-ACNC: 2.1 AL
EOSINOPHIL # BLD AUTO: 0.21 K/UL
EOSINOPHIL NFR BLD AUTO: 3.5 %
ERYTHROCYTE [SEDIMENTATION RATE] IN BLOOD BY WESTERGREN METHOD: 11 MM/HR
HCT VFR BLD CALC: 45 %
HGB BLD-MCNC: 14.7 G/DL
IMM GRANULOCYTES NFR BLD AUTO: 0.3 %
LYMPHOCYTES # BLD AUTO: 2.02 K/UL
LYMPHOCYTES NFR BLD AUTO: 33.7 %
MAN DIFF?: NORMAL
MCHC RBC-ENTMCNC: 30.7 PG
MCHC RBC-ENTMCNC: 32.7 GM/DL
MCV RBC AUTO: 93.9 FL
MONOCYTES # BLD AUTO: 0.55 K/UL
MONOCYTES NFR BLD AUTO: 9.2 %
NEUTROPHILS # BLD AUTO: 3.15 K/UL
NEUTROPHILS NFR BLD AUTO: 52.5 %
PLATELET # BLD AUTO: 285 K/UL
POTASSIUM SERPL-SCNC: 4.6 MMOL/L
PROT SERPL-MCNC: 6.1 G/DL
RBC # BLD: 4.79 M/UL
RBC # FLD: 12 %
RHEUMATOID FACT SER QL: <10 IU/ML
SODIUM SERPL-SCNC: 140 MMOL/L
WBC # FLD AUTO: 6 K/UL

## 2021-07-13 LAB — DSDNA AB SER-ACNC: 14 IU/ML

## 2021-07-14 LAB
ALBUMIN MFR SERPL ELPH: 63.1 %
ALBUMIN SERPL-MCNC: 4 G/DL
ALBUMIN/GLOB SERPL: 1.7 RATIO
ALPHA1 GLOB MFR SERPL ELPH: 4.4 %
ALPHA1 GLOB SERPL ELPH-MCNC: 0.3 G/DL
ALPHA2 GLOB MFR SERPL ELPH: 10.1 %
ALPHA2 GLOB SERPL ELPH-MCNC: 0.6 G/DL
B-GLOBULIN MFR SERPL ELPH: 12.2 %
B-GLOBULIN SERPL ELPH-MCNC: 0.8 G/DL
DEPRECATED KAPPA LC FREE/LAMBDA SER: 0.9 RATIO
GAMMA GLOB FLD ELPH-MCNC: 0.7 G/DL
GAMMA GLOB MFR SERPL ELPH: 10.2 %
IGA SER QL IEP: 181 MG/DL
IGG SER QL IEP: 647 MG/DL
IGM SER QL IEP: 96 MG/DL
INTERPRETATION SERPL IEP-IMP: NORMAL
KAPPA LC CSF-MCNC: 1.05 MG/DL
KAPPA LC SERPL-MCNC: 0.95 MG/DL
M PROTEIN SPEC IFE-MCNC: NORMAL
PROT SERPL-MCNC: 6.4 G/DL
PROT SERPL-MCNC: 6.4 G/DL

## 2021-07-26 ENCOUNTER — APPOINTMENT (OUTPATIENT)
Dept: DERMATOLOGY | Facility: CLINIC | Age: 61
End: 2021-07-26
Payer: COMMERCIAL

## 2021-07-26 PROCEDURE — 99213 OFFICE O/P EST LOW 20 MIN: CPT | Mod: 25

## 2021-07-26 PROCEDURE — 99072 ADDL SUPL MATRL&STAF TM PHE: CPT

## 2021-07-26 PROCEDURE — 10060 I&D ABSCESS SIMPLE/SINGLE: CPT

## 2021-07-26 NOTE — PHYSICAL EXAM
[Alert] : alert [Oriented x 3] : ~L oriented x 3 [Well Nourished] : well nourished [Full Body Skin Exam Performed] : performed [FreeTextEntry3] : A full skin exam was performed including the scalp, face, neck, chest, abdomen, back, buttocks, upper extremities and lower extremities.  The patient declined examination of the breasts and genitalia.  \par The exam did show the following benign growths:\par [place] pigmented nevi.\par Seborrheic keratoses.\par Lentigines.\par \par Cystic translucent papule, right lateral canthus.

## 2021-07-26 NOTE — HISTORY OF PRESENT ILLNESS
[FreeTextEntry1] : Patient presents for skin examination. [de-identified] : Notes lesion of the right lateral canthal region, recently slowly increasing in size.  no discharge or tenderness.  No self tx.  present for a few years.

## 2021-08-02 ENCOUNTER — APPOINTMENT (OUTPATIENT)
Dept: RHEUMATOLOGY | Facility: CLINIC | Age: 61
End: 2021-08-02
Payer: COMMERCIAL

## 2021-08-02 VITALS
BODY MASS INDEX: 32.44 KG/M2 | TEMPERATURE: 97.7 F | HEIGHT: 64 IN | OXYGEN SATURATION: 98 % | DIASTOLIC BLOOD PRESSURE: 80 MMHG | HEART RATE: 70 BPM | WEIGHT: 190 LBS | SYSTOLIC BLOOD PRESSURE: 140 MMHG

## 2021-08-02 DIAGNOSIS — Z11.59 ENCOUNTER FOR SCREENING FOR OTHER VIRAL DISEASES: ICD-10-CM

## 2021-08-02 PROCEDURE — 36415 COLL VENOUS BLD VENIPUNCTURE: CPT

## 2021-08-02 PROCEDURE — 99215 OFFICE O/P EST HI 40 MIN: CPT | Mod: 25

## 2021-08-02 NOTE — PHYSICAL EXAM
[Oropharynx] : the oropharynx was normal [Full Pulse] : the pedal pulses are present [Deep Tendon Reflexes (DTR)] : deep tendon reflexes were 2+ and symmetric [General Appearance - Alert] : alert [General Appearance - Well Nourished] : well nourished [General Appearance - Well Developed] : well developed [Sclera] : the sclera and conjunctiva were normal [Outer Ear] : the ears and nose were normal in appearance [Neck Appearance] : the appearance of the neck was normal [Respiration, Rhythm And Depth] : normal respiratory rhythm and effort [Auscultation Breath Sounds / Voice Sounds] : lungs were clear to auscultation bilaterally [Heart Sounds] : normal S1 and S2 [Edema] : there was no peripheral edema [Supraclavicular Lymph Nodes Enlarged Bilaterally] : supraclavicular [No Spinal Tenderness] : no spinal tenderness [Abnormal Walk] : normal gait [Nail Clubbing] : no clubbing  or cyanosis of the fingernails [Musculoskeletal - Swelling] : no joint swelling seen [Motor Tone] : muscle strength and tone were normal [] : no rash [Motor Exam] : the motor exam was normal [Oriented To Time, Place, And Person] : oriented to person, place, and time [Impaired Insight] : insight and judgment were intact [Affect] : the affect was normal [Skin Color & Pigmentation] : normal skin color and pigmentation [Skin Turgor] : normal skin turgor [FreeTextEntry1] : + dry skin elbows [No Focal Deficits] : no focal deficits
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
## 2021-08-02 NOTE — PHYSICAL EXAM
[Oropharynx] : the oropharynx was normal [Full Pulse] : the pedal pulses are present [Deep Tendon Reflexes (DTR)] : deep tendon reflexes were 2+ and symmetric [General Appearance - Alert] : alert [General Appearance - Well Nourished] : well nourished [General Appearance - Well Developed] : well developed [Sclera] : the sclera and conjunctiva were normal [Outer Ear] : the ears and nose were normal in appearance [Neck Appearance] : the appearance of the neck was normal [Auscultation Breath Sounds / Voice Sounds] : lungs were clear to auscultation bilaterally [Respiration, Rhythm And Depth] : normal respiratory rhythm and effort [Heart Sounds] : normal S1 and S2 [Edema] : there was no peripheral edema [Supraclavicular Lymph Nodes Enlarged Bilaterally] : supraclavicular [No Spinal Tenderness] : no spinal tenderness [Abnormal Walk] : normal gait [Nail Clubbing] : no clubbing  or cyanosis of the fingernails [Musculoskeletal - Swelling] : no joint swelling seen [Motor Tone] : muscle strength and tone were normal [] : no rash [Motor Exam] : the motor exam was normal [Oriented To Time, Place, And Person] : oriented to person, place, and time [Impaired Insight] : insight and judgment were intact [Affect] : the affect was normal [Skin Color & Pigmentation] : normal skin color and pigmentation [Skin Turgor] : normal skin turgor [FreeTextEntry1] : + dry skin elbows [No Focal Deficits] : no focal deficits
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
## 2021-08-03 LAB
COVID-19 NUCLEOCAPSID  GAM ANTIBODY INTERPRETATION: NEGATIVE
COVID-19 SPIKE DOMAIN ANTIBODY INTERPRETATION: POSITIVE
SARS-COV-2 AB SERPL IA-ACNC: 194 U/ML
SARS-COV-2 AB SERPL QL IA: 0.09 INDEX

## 2021-08-10 ENCOUNTER — APPOINTMENT (OUTPATIENT)
Age: 61
End: 2021-08-10
Payer: COMMERCIAL

## 2021-08-10 PROCEDURE — 99213 OFFICE O/P EST LOW 20 MIN: CPT

## 2021-08-11 NOTE — PHYSICAL EXAM
[Normal Finger/nose] : finger to nose coordination [Normal] : no peripheral adenopathy appreciated [de-identified] : Left Shoulder Exam\par \par Inspection: No malalignment, No defects\par Skin: No masses, No lesions\par Neck: Negative Spurlings, full ROM without pain\par ROM: Range of motion of the left shoulder is fully intact with forward flexion, abduction, internal and external rotation\par Painful arc ROM: None\par Tenderness: No bicipital tenderness, no tenderness to the greater tuberosity/RTC insertion, no anterior shoulder/lesser tuberosity tenderness, positive periscapular tenderness. Negative cervical spine tenderness\par Strength: 5/5 ER, 5/5 IR in adduction, 5/5 supraspinatus testing\par AC Joint: No ttp, no pain with cross arm testing\par Biceps: Speed mildly positive\par Impingement test: Negative Horne\par Stability: Negative apprehension, negative anterior/posterior load and shift\par Vasc: 2+ radial pulse\par Neuro: AIN, PIN, Ulnar nerve in tact to motor\par Sensation: In tact to light touch throughout\par \par  [de-identified] : BOBBYR [de-identified] : 3 x-ray views of the left shoulder were obtained, there are no osseous abnormalities\par \par MRI of the shoulder reviewed taken previously reveals a 5 mm wide low-grade articular surface partial tear of the\par supraspinatus tendon at the attachment on the greater tuberosity measuring less than 50 percent the thickness of the tendon. There is a small partial tear of the proximal attachment of biceps tendon. 

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
## 2021-08-23 ENCOUNTER — APPOINTMENT (OUTPATIENT)
Dept: RHEUMATOLOGY | Facility: CLINIC | Age: 61
End: 2021-08-23

## 2021-08-25 ENCOUNTER — APPOINTMENT (OUTPATIENT)
Dept: INTERNAL MEDICINE | Facility: CLINIC | Age: 61
End: 2021-08-25
Payer: COMMERCIAL

## 2021-08-25 VITALS
RESPIRATION RATE: 16 BRPM | SYSTOLIC BLOOD PRESSURE: 118 MMHG | HEART RATE: 98 BPM | TEMPERATURE: 97.9 F | WEIGHT: 192 LBS | BODY MASS INDEX: 32.78 KG/M2 | DIASTOLIC BLOOD PRESSURE: 84 MMHG | HEIGHT: 64 IN | OXYGEN SATURATION: 98 %

## 2021-08-25 DIAGNOSIS — E66.9 OBESITY, UNSPECIFIED: ICD-10-CM

## 2021-08-25 LAB
25(OH)D3 SERPL-MCNC: 21.6 NG/ML
CHOLEST SERPL-MCNC: 261 MG/DL
HDLC SERPL-MCNC: 56 MG/DL
LDLC SERPL CALC-MCNC: 174 MG/DL
NONHDLC SERPL-MCNC: 205 MG/DL
TRIGL SERPL-MCNC: 156 MG/DL

## 2021-08-25 PROCEDURE — 99396 PREV VISIT EST AGE 40-64: CPT

## 2021-08-27 NOTE — HISTORY OF PRESENT ILLNESS
[FreeTextEntry1] : CPE [de-identified] : ERNESTO YEN is a 61 year F who comes in for an annual physical exam.\par Pt did see rheum recently and had bw done. \par Patient has increased amounts of neck pain.\par Patient denies any cp, sob,abdominal pain, nausea, vomiting, palpitations, fever, chills, constipation, diarrhea.\par

## 2021-08-27 NOTE — ASSESSMENT
[FreeTextEntry1] : 1.hyperlipidemia: Recent labs recently reviewed. \par Patient advised on low cholesterol diet-decrease in white carbs and exercise 150 minutes per week.\par \par 2.health maintenance: Patient counseled regarding recommendations for vaccines, seat belt safety, diet and exercise and all preventative screening.\par \par 3.neck pain: Given referral to physical therapy.

## 2021-09-28 ENCOUNTER — APPOINTMENT (OUTPATIENT)
Dept: ORTHOPEDIC SURGERY | Facility: CLINIC | Age: 61
End: 2021-09-28
Payer: COMMERCIAL

## 2021-09-28 DIAGNOSIS — M24.812 OTHER SPECIFIC JOINT DERANGEMENTS OF LEFT SHOULDER, NOT ELSEWHERE CLASSIFIED: ICD-10-CM

## 2021-09-28 PROCEDURE — XXXXX: CPT

## 2021-10-27 ENCOUNTER — APPOINTMENT (OUTPATIENT)
Dept: DERMATOLOGY | Facility: CLINIC | Age: 61
End: 2021-10-27
Payer: COMMERCIAL

## 2021-10-27 DIAGNOSIS — D48.5 NEOPLASM OF UNCERTAIN BEHAVIOR OF SKIN: ICD-10-CM

## 2021-10-27 PROCEDURE — 11311 SHAVE SKIN LESION 0.6-1.0 CM: CPT

## 2021-10-27 NOTE — HISTORY OF PRESENT ILLNESS
[FreeTextEntry1] : Recurrent lesion, right lateral canthus. [de-identified] : S/P I&D of presumed hidrocystoma.

## 2021-12-06 ENCOUNTER — TRANSCRIPTION ENCOUNTER (OUTPATIENT)
Age: 61
End: 2021-12-06

## 2022-01-05 ENCOUNTER — NON-APPOINTMENT (OUTPATIENT)
Age: 62
End: 2022-01-05

## 2022-01-24 ENCOUNTER — APPOINTMENT (OUTPATIENT)
Dept: ORTHOPEDIC SURGERY | Facility: CLINIC | Age: 62
End: 2022-01-24
Payer: COMMERCIAL

## 2022-01-24 DIAGNOSIS — M54.16 RADICULOPATHY, LUMBAR REGION: ICD-10-CM

## 2022-01-24 PROBLEM — M24.812 INTERNAL DERANGEMENT OF LEFT SHOULDER: Status: ACTIVE | Noted: 2020-10-28

## 2022-01-24 PROCEDURE — 99213 OFFICE O/P EST LOW 20 MIN: CPT
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
## 2022-01-24 NOTE — PHYSICAL EXAM
[Normal Finger/nose] : finger to nose coordination [Normal] : no peripheral adenopathy appreciated [de-identified] : Left Shoulder Exam\par \par Inspection: No malalignment, No defects\par Skin: No masses, No lesions\par Neck: Negative Spurlings, full ROM without pain\par ROM: Range of motion of the left shoulder is fully intact with forward flexion, abduction, internal and external rotation\par Painful arc ROM: None\par Tenderness: No bicipital tenderness, no tenderness to the greater tuberosity/RTC insertion, no anterior shoulder/lesser tuberosity tenderness, positive periscapular tenderness. Negative cervical spine tenderness\par Strength: 5/5 ER, 5/5 IR in adduction, 5/5 supraspinatus testing\par AC Joint: No ttp, no pain with cross arm testing\par Biceps: Speed mildly positive\par Impingement test: Negative Horne\par Stability: Negative apprehension, negative anterior/posterior load and shift\par Vasc: 2+ radial pulse\par Neuro: AIN, PIN, Ulnar nerve in tact to motor\par Sensation: In tact to light touch throughout\par \par  [de-identified] : BOBBYR [de-identified] : 3 x-ray views of the left shoulder were obtained previously , there are no osseous abnormalities\par \par MRI of the shoulder reviewed taken previously reveals a 5 mm wide low-grade articular surface partial tear of the\par supraspinatus tendon at the attachment on the greater tuberosity measuring less than 50 percent the thickness of the tendon. There is a small partial tear of the proximal attachment of biceps tendon. 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

## 2022-01-24 NOTE — PHYSICAL EXAM
[Normal Finger/nose] : finger to nose coordination [Normal] : no peripheral adenopathy appreciated [de-identified] : Left Shoulder Exam\par \par Inspection: No malalignment, No defects\par Skin: No masses, No lesions\par Neck: Negative Spurlings, full ROM without pain\par ROM: Range of motion of the left shoulder is fully intact with forward flexion, abduction, internal and external rotation\par Painful arc ROM: None\par Tenderness: No bicipital tenderness, no tenderness to the greater tuberosity/RTC insertion, no anterior shoulder/lesser tuberosity tenderness, positive periscapular tenderness. Negative cervical spine tenderness\par Strength: 5/5 ER, 5/5 IR in adduction, 5/5 supraspinatus testing\par AC Joint: No ttp, no pain with cross arm testing\par Biceps: Speed mildly positive\par Impingement test: Negative Horne\par Stability: Negative apprehension, negative anterior/posterior load and shift\par Vasc: 2+ radial pulse\par Neuro: AIN, PIN, Ulnar nerve in tact to motor\par Sensation: In tact to light touch throughout\par \par  [de-identified] : BOBBYR [de-identified] : 3 x-ray views of the left shoulder were obtained previously , there are no osseous abnormalities\par \par MRI of the shoulder reviewed taken previously reveals a 5 mm wide low-grade articular surface partial tear of the\par supraspinatus tendon at the attachment on the greater tuberosity measuring less than 50 percent the thickness of the tendon. There is a small partial tear of the proximal attachment of biceps tendon. 

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
## 2022-02-03 ENCOUNTER — APPOINTMENT (OUTPATIENT)
Dept: RHEUMATOLOGY | Facility: CLINIC | Age: 62
End: 2022-02-03
Payer: COMMERCIAL

## 2022-02-03 PROCEDURE — 99213 OFFICE O/P EST LOW 20 MIN: CPT | Mod: 95
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
## 2022-02-03 NOTE — ASSESSMENT
[FreeTextEntry1] : 62 yo woman PMHX MM here for f/u of sicca\par \par \par Thursday August 11 at 6 AM TEB (patient aware)\par \par #Sicca. 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  also with polyarthralgia.  denies joint issues or pain\par -- oral dryness.  continue dental care routinely, biotin, water\par -- ocular dryness. on Systane per optho - however unclear how much it is helping ?  question switch to refresh, Restasis, plugs.   discussed that plugs may not be option as at times she gets tearing and this may make it worse.  \par -- check inflammatory markers\par \par #toe pain \par likely degenerative end of day non-swollen pain, chronic \par -- if worsens xray\par \par #low vitamin d. \par is postmenopausal .  her GYN follows her bone health currently. she says the last one didn’t show op but they are monitoring \par -- supplement vit d\par -- check vitamin d\par \par #screening for viral disease\par she is a teacher, she is vaccinated, she is boosted\par \par \par More than 50% of the encounter was spent counseling the patient on differential, workup, disease course and treatment/management.  Education was provided to the patient during this encounter.  All questions and concerns were addressed and answered.   The patient verbalized understanding and agreed to the plan. \par \par Patient has been instructed to call for an appointment if new symptoms develop.\par Patient has been instructed to make a followup appointment in 6 months.\par \par Time spent on the encounter included, but is not limited to, preparing to see the patient, obtaining and/or reviewing separately obtained history, performing the evaluation, counseling and educating, independently interpreting results with communication to patient, order placement, referring and/or communicating with other health professionals as described, and documenting clinical information in the electronic health record\par

## 2022-02-07 ENCOUNTER — APPOINTMENT (OUTPATIENT)
Dept: NEUROSURGERY | Facility: CLINIC | Age: 62
End: 2022-02-07
Payer: COMMERCIAL

## 2022-02-07 ENCOUNTER — OUTPATIENT (OUTPATIENT)
Dept: OUTPATIENT SERVICES | Facility: HOSPITAL | Age: 62
LOS: 1 days | End: 2022-02-07
Payer: COMMERCIAL

## 2022-02-07 ENCOUNTER — APPOINTMENT (OUTPATIENT)
Dept: RADIOLOGY | Facility: CLINIC | Age: 62
End: 2022-02-07
Payer: COMMERCIAL

## 2022-02-07 VITALS
WEIGHT: 190 LBS | OXYGEN SATURATION: 98 % | TEMPERATURE: 98 F | SYSTOLIC BLOOD PRESSURE: 171 MMHG | BODY MASS INDEX: 32.44 KG/M2 | HEART RATE: 66 BPM | HEIGHT: 64 IN | DIASTOLIC BLOOD PRESSURE: 94 MMHG

## 2022-02-07 DIAGNOSIS — M54.2 CERVICALGIA: ICD-10-CM

## 2022-02-07 DIAGNOSIS — M54.50 LOW BACK PAIN, UNSPECIFIED: ICD-10-CM

## 2022-02-07 DIAGNOSIS — R20.2 PARESTHESIA OF SKIN: ICD-10-CM

## 2022-02-07 DIAGNOSIS — M54.16 RADICULOPATHY, LUMBAR REGION: ICD-10-CM

## 2022-02-07 DIAGNOSIS — M54.40 LUMBAGO WITH SCIATICA, UNSPECIFIED SIDE: ICD-10-CM

## 2022-02-07 PROCEDURE — 72052 X-RAY EXAM NECK SPINE 6/>VWS: CPT

## 2022-02-07 PROCEDURE — 72052 X-RAY EXAM NECK SPINE 6/>VWS: CPT | Mod: 26

## 2022-02-07 PROCEDURE — 72110 X-RAY EXAM L-2 SPINE 4/>VWS: CPT

## 2022-02-07 PROCEDURE — 99203 OFFICE O/P NEW LOW 30 MIN: CPT

## 2022-02-07 PROCEDURE — 72110 X-RAY EXAM L-2 SPINE 4/>VWS: CPT | Mod: 26
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
## 2022-02-23 ENCOUNTER — NON-APPOINTMENT (OUTPATIENT)
Age: 62
End: 2022-02-23

## 2022-02-23 LAB
25(OH)D3 SERPL-MCNC: 25.2 NG/ML
ALBUMIN SERPL ELPH-MCNC: 4.2 G/DL
ALP BLD-CCNC: 72 U/L
ALT SERPL-CCNC: 54 U/L
ANION GAP SERPL CALC-SCNC: 14 MMOL/L
AST SERPL-CCNC: 28 U/L
BASOPHILS # BLD AUTO: 0.05 K/UL
BASOPHILS NFR BLD AUTO: 0.8 %
BILIRUB SERPL-MCNC: 0.3 MG/DL
BUN SERPL-MCNC: 9 MG/DL
CALCIUM SERPL-MCNC: 9.3 MG/DL
CHLORIDE SERPL-SCNC: 105 MMOL/L
CO2 SERPL-SCNC: 22 MMOL/L
CREAT SERPL-MCNC: 0.82 MG/DL
CRP SERPL-MCNC: <3 MG/L
ENA SS-A AB SER IA-ACNC: <0.2 AL
ENA SS-B AB SER IA-ACNC: 2 AL
EOSINOPHIL # BLD AUTO: 0.24 K/UL
EOSINOPHIL NFR BLD AUTO: 3.7 %
ERYTHROCYTE [SEDIMENTATION RATE] IN BLOOD BY WESTERGREN METHOD: 8 MM/HR
HCT VFR BLD CALC: 44.1 %
HGB BLD-MCNC: 14.2 G/DL
IMM GRANULOCYTES NFR BLD AUTO: 0.3 %
LYMPHOCYTES # BLD AUTO: 2.29 K/UL
LYMPHOCYTES NFR BLD AUTO: 35.5 %
MAN DIFF?: NORMAL
MCHC RBC-ENTMCNC: 30.3 PG
MCHC RBC-ENTMCNC: 32.2 GM/DL
MCV RBC AUTO: 94.2 FL
MONOCYTES # BLD AUTO: 0.71 K/UL
MONOCYTES NFR BLD AUTO: 11 %
NEUTROPHILS # BLD AUTO: 3.14 K/UL
NEUTROPHILS NFR BLD AUTO: 48.7 %
PLATELET # BLD AUTO: 254 K/UL
POTASSIUM SERPL-SCNC: 4.3 MMOL/L
PROT SERPL-MCNC: 6.6 G/DL
RBC # BLD: 4.68 M/UL
RBC # FLD: 12 %
RHEUMATOID FACT SER QL: 11 IU/ML
SODIUM SERPL-SCNC: 141 MMOL/L
WBC # FLD AUTO: 6.45 K/UL

## 2022-02-24 LAB
ALBUMIN MFR SERPL ELPH: 62.5 %
ALBUMIN SERPL-MCNC: 4.1 G/DL
ALBUMIN/GLOB SERPL: 1.6 RATIO
ALPHA1 GLOB MFR SERPL ELPH: 4.2 %
ALPHA1 GLOB SERPL ELPH-MCNC: 0.3 G/DL
ALPHA2 GLOB MFR SERPL ELPH: 9.4 %
ALPHA2 GLOB SERPL ELPH-MCNC: 0.6 G/DL
B-GLOBULIN MFR SERPL ELPH: 11.9 %
B-GLOBULIN SERPL ELPH-MCNC: 0.8 G/DL
DEPRECATED KAPPA LC FREE/LAMBDA SER: 0.98 RATIO
GAMMA GLOB FLD ELPH-MCNC: 0.8 G/DL
GAMMA GLOB MFR SERPL ELPH: 12 %
IGA SER QL IEP: 230 MG/DL
IGG SER QL IEP: 781 MG/DL
IGM SER QL IEP: 96 MG/DL
INTERPRETATION SERPL IEP-IMP: NORMAL
KAPPA LC CSF-MCNC: 1.24 MG/DL
KAPPA LC SERPL-MCNC: 1.22 MG/DL
M PROTEIN SPEC IFE-MCNC: NORMAL
PROT SERPL-MCNC: 6.6 G/DL
PROT SERPL-MCNC: 6.6 G/DL

## 2022-03-10 ENCOUNTER — APPOINTMENT (OUTPATIENT)
Dept: DERMATOLOGY | Facility: CLINIC | Age: 62
End: 2022-03-10
Payer: COMMERCIAL

## 2022-03-10 PROCEDURE — 99213 OFFICE O/P EST LOW 20 MIN: CPT

## 2022-03-10 NOTE — PHYSICAL EXAM
[Alert] : alert [Oriented x 3] : ~L oriented x 3 [Well Nourished] : well nourished [Full Body Skin Exam Performed] : performed [FreeTextEntry3] : A full skin exam was performed including the scalp, face (including lips, ears, nose and eyes), neck, chest, abdomen, back, buttocks, upper extremities and lower extremities.  The patient declined examination of the genitalia.  \par The exam revealed the following benign growths:\par [place] pigmented nevi.\par Seborrheic keratoses.\par Lentigines.

## 2022-03-10 NOTE — HISTORY OF PRESENT ILLNESS
[FreeTextEntry1] : Patient presents for skin examination. [de-identified] : Denies new, changing, bleeding or tender lesions on the skin over the past 6 months.\par

## 2022-04-05 ENCOUNTER — APPOINTMENT (OUTPATIENT)
Dept: NEUROSURGERY | Facility: CLINIC | Age: 62
End: 2022-04-05
Payer: COMMERCIAL

## 2022-04-05 VITALS
WEIGHT: 190 LBS | HEIGHT: 64 IN | BODY MASS INDEX: 32.44 KG/M2 | OXYGEN SATURATION: 98 % | HEART RATE: 71 BPM | DIASTOLIC BLOOD PRESSURE: 97 MMHG | SYSTOLIC BLOOD PRESSURE: 169 MMHG | TEMPERATURE: 98.3 F

## 2022-04-05 DIAGNOSIS — M54.2 CERVICALGIA: ICD-10-CM

## 2022-04-05 DIAGNOSIS — M54.50 LOW BACK PAIN, UNSPECIFIED: ICD-10-CM
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
## 2022-04-05 PROCEDURE — 99214 OFFICE O/P EST MOD 30 MIN: CPT
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
## 2022-04-11 PROBLEM — Z11.59 SCREENING FOR VIRAL DISEASE: Status: ACTIVE | Noted: 2021-08-02

## 2022-04-23 ENCOUNTER — TRANSCRIPTION ENCOUNTER (OUTPATIENT)
Age: 62
End: 2022-04-23

## 2022-05-26 ENCOUNTER — APPOINTMENT (OUTPATIENT)
Dept: NEUROSURGERY | Facility: CLINIC | Age: 62
End: 2022-05-26

## 2022-06-03 ENCOUNTER — APPOINTMENT (OUTPATIENT)
Dept: INTERNAL MEDICINE | Facility: CLINIC | Age: 62
End: 2022-06-03
Payer: COMMERCIAL

## 2022-06-03 VITALS
OXYGEN SATURATION: 97 % | SYSTOLIC BLOOD PRESSURE: 136 MMHG | RESPIRATION RATE: 16 BRPM | BODY MASS INDEX: 31.41 KG/M2 | DIASTOLIC BLOOD PRESSURE: 83 MMHG | HEART RATE: 89 BPM | HEIGHT: 64 IN | WEIGHT: 184 LBS | TEMPERATURE: 98.6 F

## 2022-06-03 DIAGNOSIS — Z87.891 PERSONAL HISTORY OF NICOTINE DEPENDENCE: ICD-10-CM

## 2022-06-03 DIAGNOSIS — R06.02 SHORTNESS OF BREATH: ICD-10-CM

## 2022-06-03 PROCEDURE — 99204 OFFICE O/P NEW MOD 45 MIN: CPT | Mod: 25

## 2022-06-03 PROCEDURE — 99214 OFFICE O/P EST MOD 30 MIN: CPT | Mod: 25

## 2022-06-03 PROCEDURE — ZZZZZ: CPT

## 2022-06-03 PROCEDURE — 94060 EVALUATION OF WHEEZING: CPT

## 2022-06-03 PROCEDURE — 94729 DIFFUSING CAPACITY: CPT

## 2022-06-03 PROCEDURE — 94727 GAS DIL/WSHOT DETER LNG VOL: CPT

## 2022-06-03 NOTE — DISCUSSION/SUMMARY
[FreeTextEntry1] : Ms. Caal is a 61-year-old female presents for a pulmonary evaluation. She had a viral upper respiratory flexion approximately 1 month ago with cough and wheezing. She is currently asymptomatic except for some shortness of breath on exertion. She is not on metered-dose inhaler therapy at present. Complete pulmonary function tests show no evidence of obstructive or restrictive lung disease. Patient will not require metered-dose inhaler therapy. Followup in this office on an as-needed basis.

## 2022-06-03 NOTE — HISTORY OF PRESENT ILLNESS
[TextBox_4] : Mrs. Delgado is a 61-year-old female who presents for initial pulmonary evaluation. Patient had probable viral upper respiratory infection approximately one month ago. She went to an outpatient urgent center for evaluation. At the time she was having some shortness of breath and wheezing. She was given albuterol meter dose inhaler as well as a course of prednisone. Her symptoms improved. She was advised to followup with pulmonology. The patient has a history of seasonal allergic rhinitis. Is no history of asthma. She's never been to the emergency room with an asthmatic attack. She has taken prednisone in the past. Mrs. Delgado has a remote smoking history. She is a  in the [place] school [place]

## 2022-06-03 NOTE — PROCEDURE
[FreeTextEntry1] : Complete pulmonary function tests show normal spirometry, lung volumes and flow volume loop. FEV1 is 2.72 L which is 109% predicted. FVC is 3.70 L which is 117% productive. FEV1/FVC ratio was 74%. Total capacity is 5.05 L which is 102% predicted. Respiratory reserve I am is 0.2 L which is 25% predicted. This is most likely due to abdominal obesity. Diffusing capacity is 95%.

## 2022-08-10 ENCOUNTER — NON-APPOINTMENT (OUTPATIENT)
Age: 62
End: 2022-08-10

## 2022-08-11 ENCOUNTER — APPOINTMENT (OUTPATIENT)
Dept: RHEUMATOLOGY | Facility: CLINIC | Age: 62
End: 2022-08-11

## 2022-08-11 PROCEDURE — 99213 OFFICE O/P EST LOW 20 MIN: CPT | Mod: 95

## 2022-08-11 NOTE — HISTORY OF PRESENT ILLNESS
[Home] : at home, [unfilled] , at the time of the visit. [Other Location: e.g. Home (Enter Location, City,State)___] : at [unfilled] [Verbal consent obtained from patient] : the patient, [unfilled] [de-identified] : 8.11.22 [FreeTextEntry1] : \par INTERVAL HX \par feels well - no complaints \par dryness is better as not at work - not wearing mask all day \par just went to dr velasco and her evaluation was good \par the mouth is about the same \par saw pulmonary - no issues - had seen him for URI sxs and negative covid - \par biotin drops prn\par PE with dr khanna is end of september - will get b/w at that time \par takes the once a week vitamin d\par had a dexa a couple of years ago - saw dr raza who is gyn - will be f/u with gyn

## 2022-08-11 NOTE — ASSESSMENT
[FreeTextEntry1] : Ms. Delgado is a former smoker with a PMHX MM here for f/u of sicca\par \par - f/u TEB March 2 Thursday - 6 AM (patient aware) \par \par #Sicca. 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  also with polyarthralgia.  denies joint issues or pain\par -- oral dryness.  continue dental care routinely, biotin, water\par -- ocular dryness. on Systane per optho - doing well   \par -- check inflammatory markers\par \par #low vitamin d. \par is postmenopausal .  her GYN follows her bone health currently. she says the last one didn’t show op but they are monitoring - \par -- supplement vit d\par -- check vitamin d\par \par #screening for viral disease\par she is a teacher, she is vaccinated, she is boosted\par \par \par More than 50% of the encounter was spent counseling the patient on differential, workup, disease course and treatment/management.  Education was provided to the patient during this encounter.  All questions and concerns were addressed and answered.   The patient verbalized understanding and agreed to the plan. \par \par Patient has been instructed to call for an appointment if new symptoms develop.\par Patient has been instructed to make a followup appointment in 6 months.\par \par Time spent on the encounter included, but is not limited to, preparing to see the patient, obtaining and/or reviewing separately obtained history, performing the evaluation, counseling and educating, independently interpreting results with communication to patient, order placement, referring and/or communicating with other health professionals as described, and documenting clinical information in the electronic health record\par

## 2022-08-30 ENCOUNTER — NON-APPOINTMENT (OUTPATIENT)
Age: 62
End: 2022-08-30

## 2022-09-08 ENCOUNTER — APPOINTMENT (OUTPATIENT)
Dept: DERMATOLOGY | Facility: CLINIC | Age: 62
End: 2022-09-08

## 2022-09-12 ENCOUNTER — APPOINTMENT (OUTPATIENT)
Dept: DERMATOLOGY | Facility: CLINIC | Age: 62
End: 2022-09-12

## 2022-09-12 PROCEDURE — 99213 OFFICE O/P EST LOW 20 MIN: CPT

## 2022-09-12 NOTE — PHYSICAL EXAM
[Alert] : alert [Oriented x 3] : ~L oriented x 3 [Well Nourished] : well nourished [Full Body Skin Exam Performed] : performed [FreeTextEntry3] : A full skin exam was performed including the scalp, face, neck, chest, abdomen, back, buttocks, upper extremities and lower extremities.  The patient declined examination of the breasts and genitalia.  \par The exam did show the following benign growths:\par Seborrheic keratoses.\par Lentigines.\par Translucent cystic papule of the right lateral canthal region.\par

## 2022-09-12 NOTE — ASSESSMENT
[FreeTextEntry1] : A complete skin examination was performed.  There is no evidence of skin cancer.  We discussed the importance of photoprotection, including the use of hats, protective clothing and sunscreens with an SPF of at least 30.  Sun avoidance was also discussed.  The ABCDE's of melanoma was discussed.  Regular skin exams recommended.\par \par hidrocystoma, right lateral canthus.\par Benign.\par \par

## 2022-09-12 NOTE — HISTORY OF PRESENT ILLNESS
[FreeTextEntry1] : Patient presents for skin examination. [de-identified] : Denies new, changing, bleeding or tender lesions on the skin over the past 6 months.\par

## 2022-09-27 ENCOUNTER — APPOINTMENT (OUTPATIENT)
Dept: INTERNAL MEDICINE | Facility: CLINIC | Age: 62
End: 2022-09-27

## 2022-09-27 VITALS
HEART RATE: 75 BPM | SYSTOLIC BLOOD PRESSURE: 130 MMHG | BODY MASS INDEX: 31.76 KG/M2 | TEMPERATURE: 98.4 F | OXYGEN SATURATION: 97 % | HEIGHT: 64 IN | WEIGHT: 186 LBS | DIASTOLIC BLOOD PRESSURE: 86 MMHG

## 2022-09-27 DIAGNOSIS — S13.9XXA SPRAIN OF JOINTS AND LIGAMENTS OF UNSPECIFIED PARTS OF NECK, INITIAL ENCOUNTER: ICD-10-CM

## 2022-09-27 DIAGNOSIS — J30.2 OTHER SEASONAL ALLERGIC RHINITIS: ICD-10-CM

## 2022-09-27 PROCEDURE — 99396 PREV VISIT EST AGE 40-64: CPT | Mod: 25

## 2022-09-27 PROCEDURE — G0444 DEPRESSION SCREEN ANNUAL: CPT | Mod: NC,59

## 2022-09-27 RX ORDER — ALPRAZOLAM 0.25 MG/1
0.25 TABLET ORAL
Qty: 30 | Refills: 0 | Status: ACTIVE | COMMUNITY
Start: 2018-07-02 | End: 1900-01-01

## 2022-09-27 NOTE — ASSESSMENT
[FreeTextEntry1] : 1.health maintenance: Discussed fasting blood work to get.\par Patient counseled regarding recommendations for vaccines, seat belt safety, diet and exercise and all preventative screening.\par Follow-up with Dr. Nieto for repeat colonoscopy, follow-up with GYN Dr. Beckman for repeat BMD and Pap smear.  Up-to-date with mammogram.\par \par 2.history of anxiety: Xanax 0.25 mg refilled today, [place] I stop reviewed.\par Went over instructions and side effects of medication.\par \par 3.sicca: Continue with Systane per Ortho and follow-up with rheumatology.\par \par 4.history of melanoma: Recent dermatology evaluation done.\par \par 5.cervical neck pain: Spine surgery notes reviewed, follow-up with Dr. Arreaga.\par \par 6.hyperlipidemia: Recheck fasting lipids. Patient advised on low cholesterol diet-decrease in white carbs and exercise 150 minutes per week.\par \par 7.seasonal allergies: Advised against using albuterol inhaler per pulmonary notes, discussed starting on antihistamine and went over correct technique for Flonase nasal spray.

## 2022-09-27 NOTE — HISTORY OF PRESENT ILLNESS
[FreeTextEntry1] : CPE [de-identified] : ERNESTO YEN is a 62 year F who comes in for an annual physical exam.\par Patient recently saw spine surgery for her cervical neck pain and will be following up with them and has not opted for surgery at this time.\par She was also evaluated by pulmonary and PFTs done which did not show any evidence of restrictive or obstructive lung disease.\par Pt did see rheum recently and had bw done in February. \par She has had her yearly skin check with dermatology as well.\par Patient denies any cp, sob,abdominal pain, nausea, vomiting, palpitations, fever, chills, constipation, diarrhea.\par

## 2022-09-27 NOTE — HEALTH RISK ASSESSMENT
[Former] : Former [Yes] : Yes [2 - 4 times a month (2 pts)] : 2-4 times a month (2 points) [1 or 2 (0 pts)] : 1 or 2 (0 points) [Less than monthly (1 pt)] : Less than monthly (1 point) [No] : In the past 12 months have you used drugs other than those required for medical reasons? No [0] : 2) Feeling down, depressed, or hopeless: Not at all (0) [Patient reported mammogram was normal] : Patient reported mammogram was normal [Patient reported PAP Smear was normal] : Patient reported PAP Smear was normal [Patient reported bone density results were abnormal] : Patient reported bone density results were abnormal [Patient reported colonoscopy was normal] : Patient reported colonoscopy was normal [PHQ-2 Negative - No further assessment needed] : PHQ-2 Negative - No further assessment needed [I have developed a follow-up plan documented below in the note.] : I have developed a follow-up plan documented below in the note. [YearQuit] : 1990 [CVT9Rdgvo] : 0 [EyeExamDate] : 08/2022 [MammogramDate] : 08/2022 [PapSmearDate] : 08/2022 [BoneDensityDate] : 01/2019 [ColonoscopyDate] : 01/2016

## 2022-10-02 LAB
25(OH)D3 SERPL-MCNC: 89.3 NG/ML
ALBUMIN SERPL ELPH-MCNC: 4.5 G/DL
ALP BLD-CCNC: 80 U/L
ALT SERPL-CCNC: 37 U/L
ANION GAP SERPL CALC-SCNC: 13 MMOL/L
AST SERPL-CCNC: 25 U/L
BASOPHILS # BLD AUTO: 0.06 K/UL
BASOPHILS NFR BLD AUTO: 1.1 %
BILIRUB SERPL-MCNC: 0.5 MG/DL
BUN SERPL-MCNC: 11 MG/DL
CALCIUM SERPL-MCNC: 9.8 MG/DL
CHLORIDE SERPL-SCNC: 104 MMOL/L
CHOLEST SERPL-MCNC: 229 MG/DL
CO2 SERPL-SCNC: 24 MMOL/L
CREAT SERPL-MCNC: 0.9 MG/DL
EGFR: 73 ML/MIN/1.73M2
EOSINOPHIL # BLD AUTO: 0.2 K/UL
EOSINOPHIL NFR BLD AUTO: 3.7 %
ESTIMATED AVERAGE GLUCOSE: 117 MG/DL
GLUCOSE SERPL-MCNC: 96 MG/DL
HBA1C MFR BLD HPLC: 5.7 %
HCT VFR BLD CALC: 44.2 %
HDLC SERPL-MCNC: 46 MG/DL
HGB BLD-MCNC: 14.8 G/DL
IMM GRANULOCYTES NFR BLD AUTO: 0.2 %
LDLC SERPL CALC-MCNC: 156 MG/DL
LYMPHOCYTES # BLD AUTO: 1.5 K/UL
LYMPHOCYTES NFR BLD AUTO: 27.9 %
MAN DIFF?: NORMAL
MCHC RBC-ENTMCNC: 30.7 PG
MCHC RBC-ENTMCNC: 33.5 GM/DL
MCV RBC AUTO: 91.7 FL
MONOCYTES # BLD AUTO: 0.52 K/UL
MONOCYTES NFR BLD AUTO: 9.7 %
NEUTROPHILS # BLD AUTO: 3.08 K/UL
NEUTROPHILS NFR BLD AUTO: 57.4 %
NONHDLC SERPL-MCNC: 182 MG/DL
PLATELET # BLD AUTO: 260 K/UL
POTASSIUM SERPL-SCNC: 4.6 MMOL/L
PROT SERPL-MCNC: 6.8 G/DL
RBC # BLD: 4.82 M/UL
RBC # FLD: 12.2 %
SODIUM SERPL-SCNC: 140 MMOL/L
TRIGL SERPL-MCNC: 131 MG/DL
TSH SERPL-ACNC: 1.74 UIU/ML
WBC # FLD AUTO: 5.37 K/UL

## 2022-10-11 ENCOUNTER — NON-APPOINTMENT (OUTPATIENT)
Age: 62
End: 2022-10-11

## 2022-12-31 ENCOUNTER — NON-APPOINTMENT (OUTPATIENT)
Age: 62
End: 2022-12-31

## 2023-03-02 ENCOUNTER — NON-APPOINTMENT (OUTPATIENT)
Age: 63
End: 2023-03-02

## 2023-03-02 ENCOUNTER — APPOINTMENT (OUTPATIENT)
Dept: RHEUMATOLOGY | Facility: CLINIC | Age: 63
End: 2023-03-02
Payer: COMMERCIAL

## 2023-03-02 DIAGNOSIS — M35.00 SICCA SYNDROME, UNSPECIFIED: ICD-10-CM

## 2023-03-02 PROCEDURE — 99213 OFFICE O/P EST LOW 20 MIN: CPT | Mod: 95
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
## 2023-03-02 NOTE — ASSESSMENT
[FreeTextEntry1] : Ms. Delgado is a former smoker with a PMHX MM here for f/u of sicca\par \par - f/u TEB October 19 Thursday - 6 AM (patient aware) \par \par #Sicca. 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  also with polyarthralgia.  denies joint issues or pain\par -- oral dryness.  continue dental care routinely, biotin, water\par -- ocular dryness. on Systane per optho - doing well   \par -- check inflammatory markers\par \par #low vitamin d. \par is postmenopausal .  her GYN follows her bone health currently. she says the last one didn’t show op but they are monitoring - last time popped up over so has held the vit d\par -- check vitamin d\par -- f/u DEXA with GYN\par \par #screening for viral disease\par she is a teacher, she is vaccinated, she is boosted\par \par \par More than 50% of the encounter was spent counseling the patient on differential, workup, disease course and treatment/management.  Education was provided to the patient during this encounter.  All questions and concerns were addressed and answered.   The patient verbalized understanding and agreed to the plan. \par \par Patient has been instructed to call for an appointment if new symptoms develop.\par Patient has been instructed to make a followup appointment in 6 months.\par \par Time spent on the encounter included, but is not limited to, preparing to see the patient, obtaining and/or reviewing separately obtained history, performing the evaluation, counseling and educating, independently interpreting results with communication to patient, order placement, referring and/or communicating with other health professionals as described, and documenting clinical information in the electronic health record\par

## 2023-03-21 NOTE — REASON FOR VISIT
Local Anesthesia Pre Procedure Assessment    Informed Consent:    Consent Obtained: Yes     Procedure Assessment:    Preop Diagnosis/Indications for Procedure: Pain  Planned Procedure: C7-T1 IL CALEB  Planned Anesthetic: Local    Medical History/Comorbid Conditions:    Significant Medical/Surgical History: Yes (comment) (See H&P Note)  Normal Mental Status: Yes    Examination Pertinent to Procedure Being Performed:    Evaluation of Operative Site: Clean, no deformity, redness/warmth/swelling, no masses    Other Findings:    Reviewed Current Medications and Allergies: Yes    Pertinent Lab/Diagnostic Tests:    Pertinent Lab / Diagnostic Tests: Other (comment) (See Imaging section)    Wilson Luna, DO  Interventional Pain Management  [facility]  03/21/23     [Follow-Up Visit] : a follow-up visit for [FreeTextEntry2] : Left shoulder/periscapular pain

## 2023-04-16 LAB
25(OH)D3 SERPL-MCNC: 42.2 NG/ML
CHOLEST SERPL-MCNC: 199 MG/DL
HDLC SERPL-MCNC: 45 MG/DL
LDLC SERPL CALC-MCNC: 141 MG/DL
NONHDLC SERPL-MCNC: 155 MG/DL
TRIGL SERPL-MCNC: 70 MG/DL

## 2023-04-18 ENCOUNTER — NON-APPOINTMENT (OUTPATIENT)
Age: 63
End: 2023-04-18

## 2023-05-04 ENCOUNTER — APPOINTMENT (OUTPATIENT)
Dept: DERMATOLOGY | Facility: CLINIC | Age: 63
End: 2023-05-04
Payer: COMMERCIAL

## 2023-05-04 DIAGNOSIS — L82.1 OTHER SEBORRHEIC KERATOSIS: ICD-10-CM

## 2023-05-04 PROCEDURE — 99213 OFFICE O/P EST LOW 20 MIN: CPT

## 2023-05-04 NOTE — HISTORY OF PRESENT ILLNESS
[FreeTextEntry1] : Patient presents for skin examination. [de-identified] : Denies new, changing, bleeding or tender lesions on the skin over the past 6 months.\par

## 2023-05-04 NOTE — PHYSICAL EXAM
[Alert] : alert [Oriented x 3] : ~L oriented x 3 [Well Nourished] : well nourished [Full Body Skin Exam Performed] : performed [FreeTextEntry3] : A full skin exam was performed including the scalp, face, neck, chest, abdomen, back, buttocks, upper extremities and lower extremities.  The patient declined examination of the breasts and genitalia.  \par The exam did show the following benign growths:\par Vincent pigmented nevi.\par Seborrheic keratoses.\par Lentigines - diffusely.\par \par

## 2023-05-11 ENCOUNTER — APPOINTMENT (OUTPATIENT)
Dept: INTERNAL MEDICINE | Facility: CLINIC | Age: 63
End: 2023-05-11
Payer: COMMERCIAL

## 2023-05-11 VITALS
SYSTOLIC BLOOD PRESSURE: 148 MMHG | OXYGEN SATURATION: 98 % | BODY MASS INDEX: 30.9 KG/M2 | HEIGHT: 64 IN | TEMPERATURE: 98.8 F | HEART RATE: 71 BPM | WEIGHT: 181 LBS | DIASTOLIC BLOOD PRESSURE: 98 MMHG

## 2023-05-11 VITALS — SYSTOLIC BLOOD PRESSURE: 138 MMHG | DIASTOLIC BLOOD PRESSURE: 88 MMHG

## 2023-05-11 PROCEDURE — 99214 OFFICE O/P EST MOD 30 MIN: CPT

## 2023-05-11 RX ORDER — ERGOCALCIFEROL 1.25 MG/1
1.25 MG CAPSULE, LIQUID FILLED ORAL
Qty: 12 | Refills: 3 | Status: DISCONTINUED | COMMUNITY
Start: 2019-01-08 | End: 2023-05-11

## 2023-05-11 NOTE — HISTORY OF PRESENT ILLNESS
[FreeTextEntry1] : FU [de-identified] : ERNESTO YEN is a 62 year F who comes in for a follow up visit.\par Pt with recent loss of her ex  and grieving due to that. Has stressors in terms of workplace. \par She had recent labs which we reviewed today.\par She did f/u with derm recently as has hx of melanoma.\par BP is moderately elevated in office today with repeat being 140/88. \par Patient denies any cp, sob,abdominal pain, nausea, vomiting, palpitations, fever, chills, constipation, diarrhea.\par

## 2023-05-11 NOTE — ASSESSMENT
[FreeTextEntry1] : 1.hypertension: Check blood pressure daily, if greater than 150/90, call MD. Keep 2 gm low sodium diet, exercise as tolerated.\par f/u in 2 months, moderately elevated today. \par \par 2.history of anxiety and depression: With recent exacerbation.  Start sertraline 50 mg once daily.  Continue with Xanax 0.25 mg as needed.\par Went over instructions and side effects of medication.\par Counseling provided to the patient.\par I spent 5 minutes with the patient, depression screen conducted using approved screening tool (PHQ2/9) and results of said screen discussed with patient during this encounter.\par \par 3.sicca: Continue with Systane per Ortho and follow-up with rheumatology.\par \par 4.history of melanoma: Recent dermatology evaluation done.\par \par 5.cervical neck pain: Spine surgery notes reviewed, follow-up with Dr. Arreaga.\par \par 6.hyperlipidemia: Recent lipids improved. Patient advised on low cholesterol diet-decrease in white carbs and exercise 150 minutes per week.\par \par

## 2023-05-11 NOTE — HEALTH RISK ASSESSMENT
[1] : 2) Feeling down, depressed, or hopeless for several days (1) [PHQ-2 Positive] : PHQ-2 Positive [PHQ-9 Positive] : PHQ-9 Positive [I have developed a follow-up plan documented below in the note.] : I have developed a follow-up plan documented below in the note. [[id number]] : 2 [Former] : Former [10-14] : 10-14 [> 15 Years] : > 15 Years

## 2023-07-12 ENCOUNTER — LABORATORY RESULT (OUTPATIENT)
Age: 63
End: 2023-07-12

## 2023-07-14 ENCOUNTER — APPOINTMENT (OUTPATIENT)
Dept: INTERNAL MEDICINE | Facility: CLINIC | Age: 63
End: 2023-07-14
Payer: COMMERCIAL

## 2023-07-14 VITALS
BODY MASS INDEX: 29.88 KG/M2 | WEIGHT: 175 LBS | HEART RATE: 62 BPM | SYSTOLIC BLOOD PRESSURE: 122 MMHG | HEIGHT: 64 IN | DIASTOLIC BLOOD PRESSURE: 88 MMHG | TEMPERATURE: 98.8 F | OXYGEN SATURATION: 98 %

## 2023-07-14 VITALS — DIASTOLIC BLOOD PRESSURE: 82 MMHG | SYSTOLIC BLOOD PRESSURE: 122 MMHG

## 2023-07-14 PROCEDURE — 99214 OFFICE O/P EST MOD 30 MIN: CPT

## 2023-07-14 NOTE — HISTORY OF PRESENT ILLNESS
[FreeTextEntry1] : FU [de-identified] : ERNESTO YEN is a 63 year F who comes in for a follow up visit.\par Pt notes not being active since school/work is out and reviewed labs which showed higher LDL and TC.\par She feels well on sertraline but felt tired so now takes in evening. \par \par \par Patient denies any cp, sob,abdominal pain, nausea, vomiting, palpitations, fever, chills, constipation, diarrhea.\par

## 2023-07-14 NOTE — ASSESSMENT
[FreeTextEntry1] : 1.hypertension: Check blood pressure daily, if greater than 150/90, call MD. Keep 2 gm low sodium diet, exercise as tolerated.\par f/u in 3-4 mo. \par \par 2.history of anxiety and depression: continue on sertraline 50 mg daily in the evening.  Continue with Xanax 0.25 mg as needed.\par Counseling provided to the patient.\par \par 3.hyperlipidemia: Recent lipids higher, ascvd risk score 5.88%. Patient advised on low cholesterol diet-decrease in white carbs and exercise 150 minutes per week.\par \par

## 2023-08-03 ENCOUNTER — OFFICE (OUTPATIENT)
Dept: URBAN - METROPOLITAN AREA CLINIC 102 | Facility: CLINIC | Age: 63
Setting detail: OPHTHALMOLOGY
End: 2023-08-03
Payer: COMMERCIAL

## 2023-08-03 DIAGNOSIS — M35.00: ICD-10-CM

## 2023-08-03 DIAGNOSIS — H43.393: ICD-10-CM

## 2023-08-03 DIAGNOSIS — H25.13: ICD-10-CM

## 2023-08-03 PROCEDURE — 92014 COMPRE OPH EXAM EST PT 1/>: CPT | Performed by: OPHTHALMOLOGY

## 2023-08-03 ASSESSMENT — REFRACTION_CURRENTRX
OD_OVR_VA: 20/
OD_SPHERE: -0.75
OD_OVR_VA: 20/
OD_CYLINDER: -1.00
OS_SPHERE: +0.75
OD_AXIS: 90
OD_OVR_VA: 20/
OS_CYLINDER: -1.75
OD_CYLINDER: -1.50
OD_ADD: +2.00
OS_CYLINDER: -1.00
OD_AXIS: 91
OD_AXIS: 90
OS_AXIS: 80
OS_VPRISM_DIRECTION: PROGS
OD_VPRISM_DIRECTION: BF
OS_ADD: +2.00
OS_CYLINDER: -1.75
OD_ADD: +1.75
OS_OVR_VA: 20/
OD_ADD: +2.00
OD_CYLINDER: -1.50
OD_SPHERE: +0.75
OS_ADD: +2.00
OS_OVR_VA: 20/
OD_VPRISM_DIRECTION: PROGS
OS_OVR_VA: 20/
OS_SPHERE: -0.50
OD_SPHERE: +0.50
OS_ADD: +1.75
OS_SPHERE: +0.75
OD_VPRISM_DIRECTION: PROGS
OS_VPRISM_DIRECTION: BF
OS_AXIS: 79
OS_AXIS: 90
OS_VPRISM_DIRECTION: PROGS

## 2023-08-03 ASSESSMENT — CONFRONTATIONAL VISUAL FIELD TEST (CVF)
OS_FINDINGS: FULL
OD_FINDINGS: FULL

## 2023-08-03 ASSESSMENT — KERATOMETRY
OD_AXISANGLE_DEGREES: 015
OD_K2POWER_DIOPTERS: 43.50
METHOD_AUTO_MANUAL: AUTO
OS_AXISANGLE_DEGREES: 160
OS_K1POWER_DIOPTERS: 42.75
OS_K2POWER_DIOPTERS: 43.75
OD_K1POWER_DIOPTERS: 43.25

## 2023-08-03 ASSESSMENT — REFRACTION_AUTOREFRACTION
OS_CYLINDER: -1.75
OD_AXIS: 094
OD_SPHERE: +1.50
OS_SPHERE: +1.75
OD_CYLINDER: -1.50
OS_AXIS: 086

## 2023-08-03 ASSESSMENT — REFRACTION_MANIFEST
OS_AXIS: 80
OD_VA1: 20/20
OS_SPHERE: +1.00
OS_CYLINDER: -1.75
OS_ADD: +2.25
OD_AXIS: 90
OS_VA1: 20/20
OS_AXIS: 80
OS_SPHERE: +0.75
OD_AXIS: 90
OD_SPHERE: +0.50
OD_ADD: +2.25
OD_SPHERE: +0.75
OD_ADD: +2.00
OS_ADD: +2.00
OS_CYLINDER: -1.75
OS_VA1: 20/20
OD_VA1: 20/20
OD_CYLINDER: -1.50
OD_CYLINDER: -1.50

## 2023-08-03 ASSESSMENT — AXIALLENGTH_DERIVED
OD_AL: 23.6379
OS_AL: 23.3454
OS_AL: 23.7333
OD_AL: 23.3482
OD_AL: 23.7361
OS_AL: 23.6351

## 2023-08-03 ASSESSMENT — DECREASING TEAR LAKE - SEVERITY SCORE
OD_DEC_TEARLAKE: T
OS_DEC_TEARLAKE: T

## 2023-08-03 ASSESSMENT — TONOMETRY
OD_IOP_MMHG: 13
OS_IOP_MMHG: 17

## 2023-08-03 ASSESSMENT — SPHEQUIV_DERIVED
OD_SPHEQUIV: -0.25
OD_SPHEQUIV: 0
OS_SPHEQUIV: -0.125
OD_SPHEQUIV: 0.75
OS_SPHEQUIV: 0.125
OS_SPHEQUIV: 0.875

## 2023-08-03 ASSESSMENT — VISUAL ACUITY
OD_BCVA: 20/20
OS_BCVA: 20/30

## 2023-09-11 ENCOUNTER — RX RENEWAL (OUTPATIENT)
Age: 63
End: 2023-09-11

## 2023-10-01 PROBLEM — M54.16 LUMBAR RADICULAR PAIN: Status: ACTIVE | Noted: 2022-02-07

## 2023-10-19 ENCOUNTER — APPOINTMENT (OUTPATIENT)
Dept: RHEUMATOLOGY | Facility: CLINIC | Age: 63
End: 2023-10-19
Payer: COMMERCIAL

## 2023-10-19 DIAGNOSIS — H04.123 DRY EYE SYNDROME OF BILATERAL LACRIMAL GLANDS: ICD-10-CM

## 2023-10-19 DIAGNOSIS — Z51.81 ENCOUNTER FOR THERAPEUTIC DRUG LVL MONITORING: ICD-10-CM

## 2023-10-19 DIAGNOSIS — Z79.899 OTHER LONG TERM (CURRENT) DRUG THERAPY: ICD-10-CM

## 2023-10-19 DIAGNOSIS — Z78.0 ASYMPTOMATIC MENOPAUSAL STATE: ICD-10-CM

## 2023-10-19 DIAGNOSIS — R76.8 OTHER SPECIFIED ABNORMAL IMMUNOLOGICAL FINDINGS IN SERUM: ICD-10-CM

## 2023-10-19 PROCEDURE — 99213 OFFICE O/P EST LOW 20 MIN: CPT | Mod: 95

## 2023-10-24 ENCOUNTER — APPOINTMENT (OUTPATIENT)
Dept: RHEUMATOLOGY | Facility: CLINIC | Age: 63
End: 2023-10-24

## 2023-11-14 NOTE — HEALTH RISK ASSESSMENT
Pt read [employer] message, no response at time of writing. Another attempt made to reach patient to follow up on metformin, no answer.     For Pharmacy Admin Tracking Only    Program: Cruz in place:  No  Recommendation Provided To: Provider: 1 via Note to Provider and Patient/Caregiver: 1 via [employer]  Intervention Detail: Adherence Monitorin  Intervention Accepted By: Provider: 1 and Patient/Caregiver: 0  Gap Closed?: No   Time Spent (min): 15 [Yes] : Yes [2 - 4 times a month (2 pts)] : 2-4 times a month (2 points) [1 or 2 (0 pts)] : 1 or 2 (0 points) [Never (0 pts)] : Never (0 points) [1] : 1) Little interest or pleasure doing things for several days (1) [0] : 2) Feeling down, depressed, or hopeless: Not at all (0) [Patient reported mammogram was normal] : Patient reported mammogram was normal [Patient reported PAP Smear was normal] : Patient reported PAP Smear was normal [Patient reported bone density results were abnormal] : Patient reported bone density results were abnormal [Patient reported colonoscopy was normal] : Patient reported colonoscopy was normal [] : No [PHQ-2 Negative - No further assessment needed] : PHQ-2 Negative - No further assessment needed [[id number]] : 1 [MammogramDate] : 01/2021 [PapSmearDate] : 01/2021 [BoneDensityDate] : 01/2019 [BoneDensityComments] : density loss [ColonoscopyDate] : 01/2012

## 2023-11-27 LAB
25(OH)D3 SERPL-MCNC: 38.2 NG/ML
ALBUMIN MFR SERPL ELPH: 63.2 %
ALBUMIN SERPL ELPH-MCNC: 4.5 G/DL
ALBUMIN SERPL-MCNC: 4.2 G/DL
ALBUMIN/GLOB SERPL: 1.8 RATIO
ALP BLD-CCNC: 68 U/L
ALPHA1 GLOB MFR SERPL ELPH: 3.9 %
ALPHA1 GLOB SERPL ELPH-MCNC: 0.3 G/DL
ALPHA2 GLOB MFR SERPL ELPH: 9.8 %
ALPHA2 GLOB SERPL ELPH-MCNC: 0.6 G/DL
ALT SERPL-CCNC: 34 U/L
ANION GAP SERPL CALC-SCNC: 14 MMOL/L
AST SERPL-CCNC: 26 U/L
B-GLOBULIN MFR SERPL ELPH: 12.4 %
B-GLOBULIN SERPL ELPH-MCNC: 0.8 G/DL
BILIRUB SERPL-MCNC: 0.5 MG/DL
BUN SERPL-MCNC: 11 MG/DL
CALCIUM SERPL-MCNC: 9.4 MG/DL
CHLORIDE SERPL-SCNC: 103 MMOL/L
CO2 SERPL-SCNC: 23 MMOL/L
CREAT SERPL-MCNC: 0.9 MG/DL
CRP SERPL-MCNC: <3 MG/L
DEPRECATED KAPPA LC FREE/LAMBDA SER: 1.17 RATIO
EGFR: 73 ML/MIN/1.73M2
ENA SS-A AB SER IA-ACNC: <0.2 AL
ENA SS-B AB SER IA-ACNC: 2 AL
ERYTHROCYTE [SEDIMENTATION RATE] IN BLOOD BY WESTERGREN METHOD: 4 MM/HR
GAMMA GLOB FLD ELPH-MCNC: 0.7 G/DL
GAMMA GLOB MFR SERPL ELPH: 10.7 %
HCT VFR BLD CALC: 45.7 %
HGB BLD-MCNC: 14.8 G/DL
IGA SER QL IEP: 272 MG/DL
IGG SER QL IEP: 715 MG/DL
IGM SER QL IEP: 120 MG/DL
INTERPRETATION SERPL IEP-IMP: NORMAL
KAPPA LC CSF-MCNC: 1.15 MG/DL
KAPPA LC SERPL-MCNC: 1.35 MG/DL
M PROTEIN SPEC IFE-MCNC: NORMAL
MCHC RBC-ENTMCNC: 29.9 PG
MCHC RBC-ENTMCNC: 32.4 GM/DL
MCV RBC AUTO: 92.3 FL
PLATELET # BLD AUTO: 243 K/UL
POTASSIUM SERPL-SCNC: 4.5 MMOL/L
PROT SERPL-MCNC: 6.6 G/DL
RBC # BLD: 4.95 M/UL
RBC # FLD: 12.3 %
RHEUMATOID FACT SER QL: <10 IU/ML
SODIUM SERPL-SCNC: 140 MMOL/L
WBC # FLD AUTO: 6.03 K/UL

## 2023-11-28 ENCOUNTER — APPOINTMENT (OUTPATIENT)
Dept: DERMATOLOGY | Facility: CLINIC | Age: 63
End: 2023-11-28
Payer: COMMERCIAL

## 2023-11-28 DIAGNOSIS — L81.4 OTHER MELANIN HYPERPIGMENTATION: ICD-10-CM

## 2023-11-28 DIAGNOSIS — D23.9 OTHER BENIGN NEOPLASM OF SKIN, UNSPECIFIED: ICD-10-CM

## 2023-11-28 DIAGNOSIS — L57.0 ACTINIC KERATOSIS: ICD-10-CM

## 2023-11-28 DIAGNOSIS — D22.9 MELANOCYTIC NEVI, UNSPECIFIED: ICD-10-CM

## 2023-11-28 PROCEDURE — 10060 I&D ABSCESS SIMPLE/SINGLE: CPT

## 2023-11-28 PROCEDURE — 99213 OFFICE O/P EST LOW 20 MIN: CPT | Mod: 25

## 2023-11-28 PROCEDURE — 17000 DESTRUCT PREMALG LESION: CPT | Mod: 59

## 2023-11-29 ENCOUNTER — APPOINTMENT (OUTPATIENT)
Dept: INTERNAL MEDICINE | Facility: CLINIC | Age: 63
End: 2023-11-29
Payer: COMMERCIAL

## 2023-11-29 VITALS
SYSTOLIC BLOOD PRESSURE: 160 MMHG | HEART RATE: 65 BPM | DIASTOLIC BLOOD PRESSURE: 82 MMHG | WEIGHT: 169 LBS | BODY MASS INDEX: 28.85 KG/M2 | HEIGHT: 64 IN | TEMPERATURE: 98.3 F | OXYGEN SATURATION: 97 %

## 2023-11-29 VITALS — SYSTOLIC BLOOD PRESSURE: 138 MMHG | DIASTOLIC BLOOD PRESSURE: 82 MMHG

## 2023-11-29 DIAGNOSIS — Z00.00 ENCOUNTER FOR GENERAL ADULT MEDICAL EXAMINATION W/OUT ABNORMAL FINDINGS: ICD-10-CM

## 2023-11-29 DIAGNOSIS — C43.71 MALIGNANT MELANOMA OF RIGHT LOWER LIMB, INCLUDING HIP: ICD-10-CM

## 2023-11-29 DIAGNOSIS — E55.9 VITAMIN D DEFICIENCY, UNSPECIFIED: ICD-10-CM

## 2023-11-29 LAB
25(OH)D3 SERPL-MCNC: 40.9 NG/ML
CHOLEST SERPL-MCNC: 231 MG/DL
HDLC SERPL-MCNC: 52 MG/DL
LDLC SERPL CALC-MCNC: 162 MG/DL
NONHDLC SERPL-MCNC: 179 MG/DL
TRIGL SERPL-MCNC: 99 MG/DL

## 2023-11-29 PROCEDURE — 99396 PREV VISIT EST AGE 40-64: CPT

## 2024-03-28 ENCOUNTER — NON-APPOINTMENT (OUTPATIENT)
Age: 64
End: 2024-03-28

## 2024-04-25 ENCOUNTER — RX RENEWAL (OUTPATIENT)
Age: 64
End: 2024-04-25

## 2024-04-25 RX ORDER — SERTRALINE HYDROCHLORIDE 50 MG/1
50 TABLET, FILM COATED ORAL
Qty: 90 | Refills: 1 | Status: ACTIVE | COMMUNITY
Start: 2023-05-11 | End: 1900-01-01

## 2024-05-29 ENCOUNTER — APPOINTMENT (OUTPATIENT)
Dept: INTERNAL MEDICINE | Facility: CLINIC | Age: 64
End: 2024-05-29
Payer: COMMERCIAL

## 2024-05-29 VITALS
WEIGHT: 168 LBS | HEART RATE: 54 BPM | OXYGEN SATURATION: 98 % | SYSTOLIC BLOOD PRESSURE: 142 MMHG | TEMPERATURE: 98.2 F | BODY MASS INDEX: 28.68 KG/M2 | HEIGHT: 64 IN | DIASTOLIC BLOOD PRESSURE: 78 MMHG

## 2024-05-29 VITALS — DIASTOLIC BLOOD PRESSURE: 73 MMHG | SYSTOLIC BLOOD PRESSURE: 128 MMHG

## 2024-05-29 DIAGNOSIS — F32.A ANXIETY DISORDER, UNSPECIFIED: ICD-10-CM

## 2024-05-29 DIAGNOSIS — F41.9 ANXIETY DISORDER, UNSPECIFIED: ICD-10-CM

## 2024-05-29 DIAGNOSIS — R73.03 PREDIABETES.: ICD-10-CM

## 2024-05-29 DIAGNOSIS — E78.5 HYPERLIPIDEMIA, UNSPECIFIED: ICD-10-CM

## 2024-05-29 DIAGNOSIS — I10 ESSENTIAL (PRIMARY) HYPERTENSION: ICD-10-CM

## 2024-05-29 PROCEDURE — 99214 OFFICE O/P EST MOD 30 MIN: CPT

## 2024-05-29 PROCEDURE — G2211 COMPLEX E/M VISIT ADD ON: CPT

## 2024-05-29 RX ORDER — LANOLIN ALCOHOL/MO/W.PET/CERES
CREAM (GRAM) TOPICAL
Refills: 0 | Status: ACTIVE | COMMUNITY

## 2024-05-29 NOTE — ASSESSMENT
[FreeTextEntry1] : 1.HTN: continue to monitor at home. Check blood pressure daily, if greater than 150/90 or less than 100/50, call MD. Keep 2 gm low sodium diet, exercise as tolerated. f/u in 6-8 weeks for BP check.   2.HLD: recheck fasting lipids, Patient advised on low cholesterol diet-decrease in white carbs and exercise 150 minutes per week.  3.Anxiety and depression: Counseling provided to the patient. continue on sertraline 50 mg once daily.   4.Prediabetes: recheck hba1c, Pt advised to keep diabetic diet, decrease carbs and increase dietary protein intake. Exercise as tolerated 3-4 times a week.

## 2024-05-29 NOTE — HISTORY OF PRESENT ILLNESS
[FreeTextEntry1] :  Follow Up Visit [de-identified] : ERNESTO YEN is a 63 year F who comes in for a follow up visit. Pt with hx of anxiety and depression, HLD, HTN and vitamin D deficiency. Pt notes she feels well mentally and continues to lose weight. SHe is doing intermittent fasting.  Her BP at home is doing well from 120-138 with DBP 81-85, but BP readings in office are borderline high.  Patient denies any cp, sob, abdominal pain, nausea, vomiting, palpitations, fever, chills, constipation, diarrhea.

## 2024-06-14 LAB
ANION GAP SERPL CALC-SCNC: 15 MMOL/L
BUN SERPL-MCNC: 13 MG/DL
CALCIUM SERPL-MCNC: 9.6 MG/DL
CHLORIDE SERPL-SCNC: 104 MMOL/L
CHOLEST SERPL-MCNC: 270 MG/DL
CO2 SERPL-SCNC: 22 MMOL/L
CREAT SERPL-MCNC: 0.86 MG/DL
EGFR: 77 ML/MIN/1.73M2
ESTIMATED AVERAGE GLUCOSE: 105 MG/DL
GLUCOSE SERPL-MCNC: 98 MG/DL
HBA1C MFR BLD HPLC: 5.3 %
HDLC SERPL-MCNC: 55 MG/DL
LDLC SERPL CALC-MCNC: 187 MG/DL
NONHDLC SERPL-MCNC: 215 MG/DL
POTASSIUM SERPL-SCNC: 4.9 MMOL/L
SODIUM SERPL-SCNC: 141 MMOL/L
TRIGL SERPL-MCNC: 155 MG/DL

## 2024-07-03 ENCOUNTER — APPOINTMENT (OUTPATIENT)
Dept: DERMATOLOGY | Facility: CLINIC | Age: 64
End: 2024-07-03
Payer: COMMERCIAL

## 2024-07-03 DIAGNOSIS — L81.4 OTHER MELANIN HYPERPIGMENTATION: ICD-10-CM

## 2024-07-03 DIAGNOSIS — L57.8 OTHER SKIN CHANGES DUE TO CHRONIC EXPOSURE TO NONIONIZING RADIATION: ICD-10-CM

## 2024-07-03 DIAGNOSIS — L82.1 OTHER SEBORRHEIC KERATOSIS: ICD-10-CM

## 2024-07-03 DIAGNOSIS — D22.9 MELANOCYTIC NEVI, UNSPECIFIED: ICD-10-CM

## 2024-07-03 DIAGNOSIS — D23.9 OTHER BENIGN NEOPLASM OF SKIN, UNSPECIFIED: ICD-10-CM

## 2024-07-03 PROCEDURE — 99213 OFFICE O/P EST LOW 20 MIN: CPT

## 2024-07-19 ENCOUNTER — APPOINTMENT (OUTPATIENT)
Dept: INTERNAL MEDICINE | Facility: CLINIC | Age: 64
End: 2024-07-19
Payer: COMMERCIAL

## 2024-07-19 VITALS
TEMPERATURE: 98.3 F | WEIGHT: 165 LBS | HEART RATE: 76 BPM | SYSTOLIC BLOOD PRESSURE: 140 MMHG | BODY MASS INDEX: 28.32 KG/M2 | DIASTOLIC BLOOD PRESSURE: 80 MMHG | OXYGEN SATURATION: 99 %

## 2024-07-19 VITALS — DIASTOLIC BLOOD PRESSURE: 82 MMHG | SYSTOLIC BLOOD PRESSURE: 132 MMHG

## 2024-07-19 DIAGNOSIS — I10 ESSENTIAL (PRIMARY) HYPERTENSION: ICD-10-CM

## 2024-07-19 PROCEDURE — G2211 COMPLEX E/M VISIT ADD ON: CPT

## 2024-07-19 PROCEDURE — 99213 OFFICE O/P EST LOW 20 MIN: CPT

## 2024-08-06 ENCOUNTER — OFFICE (OUTPATIENT)
Dept: URBAN - METROPOLITAN AREA CLINIC 102 | Facility: CLINIC | Age: 64
Setting detail: OPHTHALMOLOGY
End: 2024-08-06
Payer: COMMERCIAL

## 2024-08-06 VITALS — HEIGHT: 55 IN

## 2024-08-06 DIAGNOSIS — H18.523: ICD-10-CM

## 2024-08-06 DIAGNOSIS — H16.223: ICD-10-CM

## 2024-08-06 DIAGNOSIS — M35.00: ICD-10-CM

## 2024-08-06 DIAGNOSIS — H25.13: ICD-10-CM

## 2024-08-06 DIAGNOSIS — H43.393: ICD-10-CM

## 2024-08-06 DIAGNOSIS — H53.9: ICD-10-CM

## 2024-08-06 PROCEDURE — 99214 OFFICE O/P EST MOD 30 MIN: CPT | Performed by: OPHTHALMOLOGY

## 2024-08-06 PROCEDURE — 92134 CPTRZ OPH DX IMG PST SGM RTA: CPT | Performed by: OPHTHALMOLOGY

## 2024-08-06 PROCEDURE — 83861 MICROFLUID ANALY TEARS: CPT | Mod: QW | Performed by: OPHTHALMOLOGY

## 2024-08-06 ASSESSMENT — CONFRONTATIONAL VISUAL FIELD TEST (CVF)
OS_FINDINGS: FULL
OD_FINDINGS: FULL

## 2024-09-20 ENCOUNTER — OFFICE (OUTPATIENT)
Dept: URBAN - METROPOLITAN AREA CLINIC 102 | Facility: CLINIC | Age: 64
Setting detail: OPHTHALMOLOGY
End: 2024-09-20
Payer: COMMERCIAL

## 2024-09-20 DIAGNOSIS — H53.9: ICD-10-CM

## 2024-09-20 DIAGNOSIS — H18.523: ICD-10-CM

## 2024-09-20 DIAGNOSIS — H25.13: ICD-10-CM

## 2024-09-20 DIAGNOSIS — H52.4: ICD-10-CM

## 2024-09-20 PROCEDURE — 92012 INTRM OPH EXAM EST PATIENT: CPT | Performed by: OPHTHALMOLOGY

## 2024-09-20 PROCEDURE — 92083 EXTENDED VISUAL FIELD XM: CPT | Performed by: OPHTHALMOLOGY

## 2024-09-20 PROCEDURE — 92015 DETERMINE REFRACTIVE STATE: CPT | Performed by: OPHTHALMOLOGY

## 2024-09-20 ASSESSMENT — CONFRONTATIONAL VISUAL FIELD TEST (CVF)
OD_FINDINGS: FULL
OS_FINDINGS: FULL

## 2024-11-05 ENCOUNTER — RX RENEWAL (OUTPATIENT)
Age: 64
End: 2024-11-05

## 2024-12-03 ENCOUNTER — APPOINTMENT (OUTPATIENT)
Dept: INTERNAL MEDICINE | Facility: CLINIC | Age: 64
End: 2024-12-03

## 2024-12-19 ENCOUNTER — OFFICE (OUTPATIENT)
Dept: URBAN - METROPOLITAN AREA CLINIC 102 | Facility: CLINIC | Age: 64
Setting detail: OPHTHALMOLOGY
End: 2024-12-19
Payer: COMMERCIAL

## 2024-12-19 DIAGNOSIS — H16.223: ICD-10-CM

## 2024-12-19 DIAGNOSIS — M35.00: ICD-10-CM

## 2024-12-19 DIAGNOSIS — H18.523: ICD-10-CM

## 2024-12-19 DIAGNOSIS — H25.13: ICD-10-CM

## 2024-12-19 DIAGNOSIS — H43.393: ICD-10-CM

## 2024-12-19 PROCEDURE — 99213 OFFICE O/P EST LOW 20 MIN: CPT | Performed by: OPHTHALMOLOGY

## 2024-12-19 ASSESSMENT — REFRACTION_MANIFEST
OD_VA1: 20/20
OD_ADD: +2.50
OD_VA1: 20/30
OS_VA1: 20/20
OD_SPHERE: +1.50
OS_AXIS: 80
OD_AXIS: 90
OS_AXIS: 080
OD_SPHERE: +0.75
OS_SPHERE: +1.50
OS_SPHERE: +1.00
OD_AXIS: 090
OS_VA1: 20/25-
OS_CYLINDER: -1.75
OS_CYLINDER: -1.75
OS_ADD: +2.25
OD_CYLINDER: -1.25
OD_ADD: +2.25
OD_CYLINDER: -1.50
OS_ADD: +2.50

## 2024-12-19 ASSESSMENT — REFRACTION_AUTOREFRACTION
OS_CYLINDER: -1.75
OD_CYLINDER: -1.75
OS_AXIS: 085
OD_SPHERE: +2.25
OD_AXIS: 098
OS_SPHERE: +2.00

## 2024-12-19 ASSESSMENT — REFRACTION_CURRENTRX
OD_CYLINDER: -1.50
OD_VPRISM_DIRECTION: PROGS
OD_SPHERE: +0.75
OD_AXIS: 93
OS_SPHERE: +1.00
OS_VPRISM_DIRECTION: PROGS
OS_ADD: +1.75
OS_OVR_VA: 20/
OS_CYLINDER: -1.75
OS_AXIS: 73
OD_ADD: +1.75
OD_OVR_VA: 20/

## 2024-12-19 ASSESSMENT — KERATOMETRY
OD_K2POWER_DIOPTERS: 43.50
METHOD_AUTO_MANUAL: AUTO
OD_AXISANGLE_DEGREES: 174
OS_K1POWER_DIOPTERS: 43.00
OS_K2POWER_DIOPTERS: 43.75
OD_K1POWER_DIOPTERS: 42.75
OS_AXISANGLE_DEGREES: 149

## 2024-12-19 ASSESSMENT — TONOMETRY
OS_IOP_MMHG: 17
OD_IOP_MMHG: 16

## 2024-12-19 ASSESSMENT — VISUAL ACUITY
OS_BCVA: 20/50
OD_BCVA: 20/25

## 2024-12-19 ASSESSMENT — CONFRONTATIONAL VISUAL FIELD TEST (CVF)
OD_FINDINGS: FULL
OS_FINDINGS: FULL

## 2024-12-19 ASSESSMENT — DECREASING TEAR LAKE - SEVERITY SCORE
OD_DEC_TEARLAKE: T
OS_DEC_TEARLAKE: T

## 2024-12-25 PROBLEM — F10.90 ALCOHOL USE: Status: ACTIVE | Noted: 2018-01-17

## 2025-01-03 ENCOUNTER — NON-APPOINTMENT (OUTPATIENT)
Age: 65
End: 2025-01-03

## 2025-01-22 ENCOUNTER — NON-APPOINTMENT (OUTPATIENT)
Age: 65
End: 2025-01-22

## 2025-01-29 ENCOUNTER — APPOINTMENT (OUTPATIENT)
Dept: DERMATOLOGY | Facility: CLINIC | Age: 65
End: 2025-01-29
Payer: COMMERCIAL

## 2025-01-29 VITALS — HEIGHT: 64 IN | WEIGHT: 170 LBS | BODY MASS INDEX: 29.02 KG/M2

## 2025-01-29 DIAGNOSIS — L81.4 OTHER MELANIN HYPERPIGMENTATION: ICD-10-CM

## 2025-01-29 DIAGNOSIS — D22.9 MELANOCYTIC NEVI, UNSPECIFIED: ICD-10-CM

## 2025-01-29 DIAGNOSIS — L82.1 OTHER SEBORRHEIC KERATOSIS: ICD-10-CM

## 2025-01-29 DIAGNOSIS — D23.9 OTHER BENIGN NEOPLASM OF SKIN, UNSPECIFIED: ICD-10-CM

## 2025-01-29 PROCEDURE — 99213 OFFICE O/P EST LOW 20 MIN: CPT

## 2025-02-21 DIAGNOSIS — Z13.83 ENCOUNTER FOR SCREENING FOR RESPIRATORY DISORDER NEC: ICD-10-CM

## 2025-02-28 ENCOUNTER — APPOINTMENT (OUTPATIENT)
Dept: PULMONOLOGY | Facility: CLINIC | Age: 65
End: 2025-02-28

## 2025-05-19 ENCOUNTER — RX RENEWAL (OUTPATIENT)
Age: 65
End: 2025-05-19

## 2025-06-03 ENCOUNTER — APPOINTMENT (OUTPATIENT)
Dept: INTERNAL MEDICINE | Facility: CLINIC | Age: 65
End: 2025-06-03
Payer: COMMERCIAL

## 2025-06-03 VITALS
WEIGHT: 176 LBS | HEIGHT: 64 IN | HEART RATE: 68 BPM | SYSTOLIC BLOOD PRESSURE: 138 MMHG | BODY MASS INDEX: 30.05 KG/M2 | OXYGEN SATURATION: 98 % | DIASTOLIC BLOOD PRESSURE: 82 MMHG | TEMPERATURE: 98 F

## 2025-06-03 DIAGNOSIS — F32.A ANXIETY DISORDER, UNSPECIFIED: ICD-10-CM

## 2025-06-03 DIAGNOSIS — F41.9 ANXIETY DISORDER, UNSPECIFIED: ICD-10-CM

## 2025-06-03 DIAGNOSIS — I10 ESSENTIAL (PRIMARY) HYPERTENSION: ICD-10-CM

## 2025-06-03 DIAGNOSIS — L57.8 OTHER SKIN CHANGES DUE TO CHRONIC EXPOSURE TO NONIONIZING RADIATION: ICD-10-CM

## 2025-06-03 DIAGNOSIS — E78.5 HYPERLIPIDEMIA, UNSPECIFIED: ICD-10-CM

## 2025-06-03 DIAGNOSIS — E55.9 VITAMIN D DEFICIENCY, UNSPECIFIED: ICD-10-CM

## 2025-06-03 DIAGNOSIS — M35.00 SICCA SYNDROME, UNSPECIFIED: ICD-10-CM

## 2025-06-03 DIAGNOSIS — R73.03 PREDIABETES.: ICD-10-CM

## 2025-06-03 DIAGNOSIS — R74.8 ABNORMAL LEVELS OF OTHER SERUM ENZYMES: ICD-10-CM

## 2025-06-03 DIAGNOSIS — Z00.00 ENCOUNTER FOR GENERAL ADULT MEDICAL EXAMINATION W/OUT ABNORMAL FINDINGS: ICD-10-CM

## 2025-06-03 PROCEDURE — 99214 OFFICE O/P EST MOD 30 MIN: CPT

## 2025-06-03 PROCEDURE — G2211 COMPLEX E/M VISIT ADD ON: CPT | Mod: NC

## 2025-06-06 LAB
25(OH)D3 SERPL-MCNC: 63.9 NG/ML
ALBUMIN SERPL ELPH-MCNC: 4.4 G/DL
ALP BLD-CCNC: 86 U/L
ALT SERPL-CCNC: 43 U/L
ANION GAP SERPL CALC-SCNC: 16 MMOL/L
AST SERPL-CCNC: 26 U/L
BASOPHILS # BLD AUTO: 0.07 K/UL
BASOPHILS NFR BLD AUTO: 1 %
BILIRUB SERPL-MCNC: 0.2 MG/DL
BUN SERPL-MCNC: 14 MG/DL
CALCIUM SERPL-MCNC: 9.9 MG/DL
CHLORIDE SERPL-SCNC: 102 MMOL/L
CHOLEST SERPL-MCNC: 261 MG/DL
CO2 SERPL-SCNC: 20 MMOL/L
CREAT SERPL-MCNC: 0.9 MG/DL
EGFRCR SERPLBLD CKD-EPI 2021: 72 ML/MIN/1.73M2
EOSINOPHIL # BLD AUTO: 0.44 K/UL
EOSINOPHIL NFR BLD AUTO: 6.5 %
ESTIMATED AVERAGE GLUCOSE: 120 MG/DL
FOLATE SERPL-MCNC: >20 NG/ML
GLUCOSE SERPL-MCNC: 105 MG/DL
HBA1C MFR BLD HPLC: 5.8 %
HCT VFR BLD CALC: 44 %
HDLC SERPL-MCNC: 49 MG/DL
HGB BLD-MCNC: 14.5 G/DL
IMM GRANULOCYTES NFR BLD AUTO: 0.1 %
LDLC SERPL-MCNC: 174 MG/DL
LYMPHOCYTES # BLD AUTO: 1.92 K/UL
LYMPHOCYTES NFR BLD AUTO: 28.3 %
MAN DIFF?: NORMAL
MCHC RBC-ENTMCNC: 30.2 PG
MCHC RBC-ENTMCNC: 33 G/DL
MCV RBC AUTO: 91.7 FL
MONOCYTES # BLD AUTO: 0.55 K/UL
MONOCYTES NFR BLD AUTO: 8.1 %
NEUTROPHILS # BLD AUTO: 3.8 K/UL
NEUTROPHILS NFR BLD AUTO: 56 %
NONHDLC SERPL-MCNC: 212 MG/DL
PLATELET # BLD AUTO: 238 K/UL
POTASSIUM SERPL-SCNC: 4.1 MMOL/L
PROT SERPL-MCNC: 7.1 G/DL
RBC # BLD: 4.8 M/UL
RBC # FLD: 12.8 %
SODIUM SERPL-SCNC: 139 MMOL/L
TRIGL SERPL-MCNC: 206 MG/DL
TSH SERPL-ACNC: 1.92 UIU/ML
VIT B12 SERPL-MCNC: 894 PG/ML
WBC # FLD AUTO: 6.79 K/UL

## 2025-07-09 ENCOUNTER — APPOINTMENT (OUTPATIENT)
Dept: DERMATOLOGY | Facility: CLINIC | Age: 65
End: 2025-07-09
Payer: COMMERCIAL

## 2025-07-09 PROCEDURE — 99213 OFFICE O/P EST LOW 20 MIN: CPT

## 2025-08-04 ENCOUNTER — OFFICE (OUTPATIENT)
Dept: URBAN - METROPOLITAN AREA CLINIC 102 | Facility: CLINIC | Age: 65
Setting detail: OPHTHALMOLOGY
End: 2025-08-04
Payer: COMMERCIAL

## 2025-08-04 ENCOUNTER — RX ONLY (RX ONLY)
Age: 65
End: 2025-08-04

## 2025-08-04 DIAGNOSIS — H18.523: ICD-10-CM

## 2025-08-04 DIAGNOSIS — H16.223: ICD-10-CM

## 2025-08-04 DIAGNOSIS — M35.00: ICD-10-CM

## 2025-08-04 DIAGNOSIS — H43.393: ICD-10-CM

## 2025-08-04 DIAGNOSIS — H25.13: ICD-10-CM

## 2025-08-04 PROCEDURE — 92014 COMPRE OPH EXAM EST PT 1/>: CPT | Performed by: OPHTHALMOLOGY

## 2025-08-04 ASSESSMENT — DECREASING TEAR LAKE - SEVERITY SCORE
OS_DEC_TEARLAKE: T
OD_DEC_TEARLAKE: T

## 2025-08-04 ASSESSMENT — REFRACTION_MANIFEST
OD_CYLINDER: -1.25
OD_AXIS: 090
OD_ADD: +2.50
OS_ADD: +2.50
OD_ADD: +2.50
OS_VA1: 20/25-
OS_ADD: +2.50
OD_VA1: 20/30
OS_AXIS: 080
OS_CYLINDER: -1.75
OS_SPHERE: +1.50
OS_SPHERE: +1.50
OS_AXIS: 080
OD_AXIS: 090
OD_CYLINDER: -1.25
OD_SPHERE: +1.50
OD_SPHERE: +1.50
OS_VA1: 20/25-
OD_VA1: 20/30
OS_CYLINDER: -1.75

## 2025-08-04 ASSESSMENT — REFRACTION_CURRENTRX
OD_SPHERE: +0.75
OS_SPHERE: +1.00
OD_OVR_VA: 20/
OD_ADD: +1.75
OS_CYLINDER: -1.75
OS_AXIS: 73
OS_VPRISM_DIRECTION: PROGS
OD_AXIS: 93
OS_OVR_VA: 20/
OD_VPRISM_DIRECTION: PROGS
OS_ADD: +1.75
OD_CYLINDER: -1.50

## 2025-08-04 ASSESSMENT — TONOMETRY
OS_IOP_MMHG: 14
OD_IOP_MMHG: 13

## 2025-08-04 ASSESSMENT — KERATOMETRY
METHOD_AUTO_MANUAL: AUTO
OS_AXISANGLE_DEGREES: 145
OS_K1POWER_DIOPTERS: 43.00
OD_K2POWER_DIOPTERS: 43.50
OD_K1POWER_DIOPTERS: 43.00
OD_AXISANGLE_DEGREES: 024
OS_K2POWER_DIOPTERS: 43.75

## 2025-08-04 ASSESSMENT — REFRACTION_AUTOREFRACTION
OD_CYLINDER: -1.50
OS_AXIS: 068
OD_AXIS: 097
OS_CYLINDER: -1.50
OD_SPHERE: +1.75
OS_SPHERE: +1.75

## 2025-08-04 ASSESSMENT — CONFRONTATIONAL VISUAL FIELD TEST (CVF)
OS_FINDINGS: FULL
OD_FINDINGS: FULL

## 2025-08-04 ASSESSMENT — VISUAL ACUITY
OS_BCVA: 20/30-1
OD_BCVA: 20/20-1

## 2025-08-25 ENCOUNTER — APPOINTMENT (OUTPATIENT)
Dept: DERMATOLOGY | Facility: CLINIC | Age: 65
End: 2025-08-25